# Patient Record
Sex: FEMALE | Race: WHITE | URBAN - METROPOLITAN AREA
[De-identification: names, ages, dates, MRNs, and addresses within clinical notes are randomized per-mention and may not be internally consistent; named-entity substitution may affect disease eponyms.]

---

## 2019-08-25 ENCOUNTER — HOSPITAL ENCOUNTER (INPATIENT)
Age: 24
LOS: 2 days | Discharge: HOME | End: 2019-08-28
Attending: OBSTETRICS & GYNECOLOGY | Admitting: OBSTETRICS & GYNECOLOGY

## 2019-08-26 ENCOUNTER — ANESTHESIA (OUTPATIENT)
Dept: LABOR AND DELIVERY | Age: 24
End: 2019-08-26

## 2019-08-26 ENCOUNTER — ANESTHESIA EVENT (OUTPATIENT)
Dept: LABOR AND DELIVERY | Age: 24
End: 2019-08-26

## 2019-08-26 PROBLEM — O26.93 COMPLICATION OF PREGNANCY IN THIRD TRIMESTER: Status: ACTIVE | Noted: 2019-08-26

## 2019-08-26 LAB
ABO/RH: NORMAL
ABO/RH: NORMAL
AMPHETAMINE SCREEN, URINE: NOT DETECTED
ANION GAP SERPL CALCULATED.3IONS-SCNC: 14 MMOL/L (ref 7–16)
ANTIBODY SCREEN: NORMAL
BARBITURATE SCREEN URINE: NOT DETECTED
BASOPHILS ABSOLUTE: 0.06 E9/L (ref 0–0.2)
BASOPHILS RELATIVE PERCENT: 0.5 % (ref 0–2)
BENZODIAZEPINE SCREEN, URINE: NOT DETECTED
BUN BLDV-MCNC: 7 MG/DL (ref 6–20)
CALCIUM SERPL-MCNC: 9.3 MG/DL (ref 8.6–10.2)
CANNABINOID SCREEN URINE: NOT DETECTED
CHLORIDE BLD-SCNC: 102 MMOL/L (ref 98–107)
CO2: 20 MMOL/L (ref 22–29)
COCAINE METABOLITE SCREEN URINE: NOT DETECTED
CREAT SERPL-MCNC: 0.6 MG/DL (ref 0.5–1)
EOSINOPHILS ABSOLUTE: 0.04 E9/L (ref 0.05–0.5)
EOSINOPHILS RELATIVE PERCENT: 0.3 % (ref 0–6)
GFR AFRICAN AMERICAN: >60
GFR NON-AFRICAN AMERICAN: >60 ML/MIN/1.73
GLUCOSE BLD-MCNC: 87 MG/DL (ref 74–99)
HCT VFR BLD CALC: 39 % (ref 34–48)
HEMOGLOBIN: 13.3 G/DL (ref 11.5–15.5)
IMMATURE GRANULOCYTES #: 0.46 E9/L
IMMATURE GRANULOCYTES %: 4 % (ref 0–5)
LYMPHOCYTES ABSOLUTE: 2.34 E9/L (ref 1.5–4)
LYMPHOCYTES RELATIVE PERCENT: 20.1 % (ref 20–42)
Lab: NORMAL
MCH RBC QN AUTO: 32.2 PG (ref 26–35)
MCHC RBC AUTO-ENTMCNC: 34.1 % (ref 32–34.5)
MCV RBC AUTO: 94.4 FL (ref 80–99.9)
METHADONE SCREEN, URINE: NOT DETECTED
MONOCYTES ABSOLUTE: 0.85 E9/L (ref 0.1–0.95)
MONOCYTES RELATIVE PERCENT: 7.3 % (ref 2–12)
NEUTROPHILS ABSOLUTE: 7.88 E9/L (ref 1.8–7.3)
NEUTROPHILS RELATIVE PERCENT: 67.8 % (ref 43–80)
OPIATE SCREEN URINE: NOT DETECTED
PDW BLD-RTO: 12.4 FL (ref 11.5–15)
PHENCYCLIDINE SCREEN URINE: NOT DETECTED
PLATELET # BLD: 238 E9/L (ref 130–450)
PMV BLD AUTO: 9.5 FL (ref 7–12)
POTASSIUM SERPL-SCNC: 3.7 MMOL/L (ref 3.5–5)
PROPOXYPHENE SCREEN: NOT DETECTED
RBC # BLD: 4.13 E12/L (ref 3.5–5.5)
SODIUM BLD-SCNC: 136 MMOL/L (ref 132–146)
WBC # BLD: 11.6 E9/L (ref 4.5–11.5)

## 2019-08-26 PROCEDURE — 80307 DRUG TEST PRSMV CHEM ANLYZR: CPT

## 2019-08-26 PROCEDURE — 86900 BLOOD TYPING SEROLOGIC ABO: CPT

## 2019-08-26 PROCEDURE — 86901 BLOOD TYPING SEROLOGIC RH(D): CPT

## 2019-08-26 PROCEDURE — 7200000001 HC VAGINAL DELIVERY

## 2019-08-26 PROCEDURE — 99465 NB RESUSCITATION: CPT

## 2019-08-26 PROCEDURE — 1220000001 HC SEMI PRIVATE L&D R&B

## 2019-08-26 PROCEDURE — 2580000003 HC RX 258: Performed by: OBSTETRICS & GYNECOLOGY

## 2019-08-26 PROCEDURE — 85025 COMPLETE CBC W/AUTO DIFF WBC: CPT

## 2019-08-26 PROCEDURE — 2500000003 HC RX 250 WO HCPCS: Performed by: ANESTHESIOLOGY

## 2019-08-26 PROCEDURE — 36415 COLL VENOUS BLD VENIPUNCTURE: CPT

## 2019-08-26 PROCEDURE — 51702 INSERT TEMP BLADDER CATH: CPT

## 2019-08-26 PROCEDURE — 3E033VJ INTRODUCTION OF OTHER HORMONE INTO PERIPHERAL VEIN, PERCUTANEOUS APPROACH: ICD-10-PCS | Performed by: OBSTETRICS & GYNECOLOGY

## 2019-08-26 PROCEDURE — 6360000002 HC RX W HCPCS: Performed by: OBSTETRICS & GYNECOLOGY

## 2019-08-26 PROCEDURE — 86850 RBC ANTIBODY SCREEN: CPT

## 2019-08-26 PROCEDURE — 80048 BASIC METABOLIC PNL TOTAL CA: CPT

## 2019-08-26 PROCEDURE — 3700000025 EPIDURAL BLOCK: Performed by: ANESTHESIOLOGY

## 2019-08-26 RX ORDER — LANOLIN 100 %
OINTMENT (GRAM) TOPICAL PRN
Status: DISCONTINUED | OUTPATIENT
Start: 2019-08-26 | End: 2019-08-28 | Stop reason: HOSPADM

## 2019-08-26 RX ORDER — SODIUM CHLORIDE, SODIUM LACTATE, POTASSIUM CHLORIDE, CALCIUM CHLORIDE 600; 310; 30; 20 MG/100ML; MG/100ML; MG/100ML; MG/100ML
500 INJECTION, SOLUTION INTRAVENOUS
Status: ACTIVE | OUTPATIENT
Start: 2019-08-26 | End: 2019-08-26

## 2019-08-26 RX ORDER — TERBUTALINE SULFATE 1 MG/ML
0.25 INJECTION, SOLUTION SUBCUTANEOUS
Status: DISPENSED | OUTPATIENT
Start: 2019-08-26 | End: 2019-08-26

## 2019-08-26 RX ORDER — NALBUPHINE HCL 10 MG/ML
10 AMPUL (ML) INJECTION
Status: DISCONTINUED | OUTPATIENT
Start: 2019-08-26 | End: 2019-08-28 | Stop reason: HOSPADM

## 2019-08-26 RX ORDER — OXYCODONE HYDROCHLORIDE AND ACETAMINOPHEN 5; 325 MG/1; MG/1
2 TABLET ORAL EVERY 4 HOURS PRN
Status: DISCONTINUED | OUTPATIENT
Start: 2019-08-26 | End: 2019-08-28 | Stop reason: HOSPADM

## 2019-08-26 RX ORDER — KETOROLAC TROMETHAMINE 30 MG/ML
30 INJECTION, SOLUTION INTRAMUSCULAR; INTRAVENOUS EVERY 6 HOURS PRN
Status: ACTIVE | OUTPATIENT
Start: 2019-08-26 | End: 2019-08-26

## 2019-08-26 RX ORDER — ONDANSETRON 2 MG/ML
4 INJECTION INTRAMUSCULAR; INTRAVENOUS EVERY 6 HOURS PRN
Status: DISCONTINUED | OUTPATIENT
Start: 2019-08-26 | End: 2019-08-28 | Stop reason: HOSPADM

## 2019-08-26 RX ORDER — OXYCODONE HYDROCHLORIDE AND ACETAMINOPHEN 5; 325 MG/1; MG/1
1 TABLET ORAL EVERY 4 HOURS PRN
Status: DISCONTINUED | OUTPATIENT
Start: 2019-08-26 | End: 2019-08-28 | Stop reason: HOSPADM

## 2019-08-26 RX ORDER — SODIUM CHLORIDE 0.9 % (FLUSH) 0.9 %
10 SYRINGE (ML) INJECTION EVERY 12 HOURS SCHEDULED
Status: DISCONTINUED | OUTPATIENT
Start: 2019-08-26 | End: 2019-08-28 | Stop reason: HOSPADM

## 2019-08-26 RX ORDER — EPHEDRINE SULFATE/0.9% NACL/PF 50 MG/5 ML
10 SYRINGE (ML) INTRAVENOUS EVERY 5 MIN PRN
Status: DISCONTINUED | OUTPATIENT
Start: 2019-08-26 | End: 2019-08-28 | Stop reason: HOSPADM

## 2019-08-26 RX ORDER — SODIUM CHLORIDE, SODIUM LACTATE, POTASSIUM CHLORIDE, CALCIUM CHLORIDE 600; 310; 30; 20 MG/100ML; MG/100ML; MG/100ML; MG/100ML
INJECTION, SOLUTION INTRAVENOUS CONTINUOUS
Status: DISCONTINUED | OUTPATIENT
Start: 2019-08-26 | End: 2019-08-28 | Stop reason: HOSPADM

## 2019-08-26 RX ORDER — LIDOCAINE HYDROCHLORIDE 10 MG/ML
INJECTION, SOLUTION EPIDURAL; INFILTRATION; INTRACAUDAL; PERINEURAL
Status: DISPENSED
Start: 2019-08-26 | End: 2019-08-26

## 2019-08-26 RX ORDER — ACETAMINOPHEN 325 MG/1
650 TABLET ORAL EVERY 4 HOURS PRN
Status: DISCONTINUED | OUTPATIENT
Start: 2019-08-26 | End: 2019-08-28 | Stop reason: HOSPADM

## 2019-08-26 RX ORDER — PRENATAL VIT NO.126/IRON/FOLIC 28MG-0.8MG
1 TABLET ORAL DAILY
Refills: 1 | COMMUNITY
Start: 2019-08-16

## 2019-08-26 RX ORDER — NALBUPHINE HCL 10 MG/ML
AMPUL (ML) INJECTION
Status: DISPENSED
Start: 2019-08-26 | End: 2019-08-27

## 2019-08-26 RX ORDER — NALBUPHINE HCL 10 MG/ML
10 AMPUL (ML) INJECTION
Status: COMPLETED | OUTPATIENT
Start: 2019-08-26 | End: 2019-08-26

## 2019-08-26 RX ORDER — SODIUM CHLORIDE 0.9 % (FLUSH) 0.9 %
10 SYRINGE (ML) INJECTION PRN
Status: DISCONTINUED | OUTPATIENT
Start: 2019-08-26 | End: 2019-08-28 | Stop reason: HOSPADM

## 2019-08-26 RX ORDER — METHYLERGONOVINE MALEATE 0.2 MG/ML
200 INJECTION INTRAVENOUS PRN
Status: DISCONTINUED | OUTPATIENT
Start: 2019-08-26 | End: 2019-08-28 | Stop reason: HOSPADM

## 2019-08-26 RX ORDER — DOCUSATE SODIUM 100 MG/1
100 CAPSULE, LIQUID FILLED ORAL DAILY
Status: DISCONTINUED | OUTPATIENT
Start: 2019-08-26 | End: 2019-08-28 | Stop reason: HOSPADM

## 2019-08-26 RX ORDER — FERROUS SULFATE 325(65) MG
325 TABLET ORAL 2 TIMES DAILY WITH MEALS
Status: DISCONTINUED | OUTPATIENT
Start: 2019-08-26 | End: 2019-08-28 | Stop reason: HOSPADM

## 2019-08-26 RX ORDER — SODIUM CHLORIDE, SODIUM LACTATE, POTASSIUM CHLORIDE, CALCIUM CHLORIDE 600; 310; 30; 20 MG/100ML; MG/100ML; MG/100ML; MG/100ML
500 INJECTION, SOLUTION INTRAVENOUS CONTINUOUS
Status: DISCONTINUED | OUTPATIENT
Start: 2019-08-26 | End: 2019-08-28 | Stop reason: HOSPADM

## 2019-08-26 RX ADMIN — NALBUPHINE HYDROCHLORIDE 10 MG: 10 INJECTION, SOLUTION INTRAMUSCULAR; INTRAVENOUS; SUBCUTANEOUS at 03:25

## 2019-08-26 RX ADMIN — Medication 15 ML: at 14:28

## 2019-08-26 RX ADMIN — SODIUM CHLORIDE, POTASSIUM CHLORIDE, SODIUM LACTATE AND CALCIUM CHLORIDE: 600; 310; 30; 20 INJECTION, SOLUTION INTRAVENOUS at 14:46

## 2019-08-26 RX ADMIN — NALBUPHINE HYDROCHLORIDE 10 MG: 10 INJECTION, SOLUTION INTRAMUSCULAR; INTRAVENOUS; SUBCUTANEOUS at 00:31

## 2019-08-26 RX ADMIN — SODIUM CHLORIDE, POTASSIUM CHLORIDE, SODIUM LACTATE AND CALCIUM CHLORIDE: 600; 310; 30; 20 INJECTION, SOLUTION INTRAVENOUS at 12:21

## 2019-08-26 RX ADMIN — SODIUM CHLORIDE, POTASSIUM CHLORIDE, SODIUM LACTATE AND CALCIUM CHLORIDE: 600; 310; 30; 20 INJECTION, SOLUTION INTRAVENOUS at 00:29

## 2019-08-26 RX ADMIN — Medication 10 ML: at 08:45

## 2019-08-26 RX ADMIN — Medication 2 MILLI-UNITS/MIN: at 13:00

## 2019-08-26 RX ADMIN — Medication 15 ML/HR: at 14:41

## 2019-08-26 ASSESSMENT — PAIN SCALES - GENERAL
PAINLEVEL_OUTOF10: 8
PAINLEVEL_OUTOF10: 2
PAINLEVEL_OUTOF10: 5
PAINLEVEL_OUTOF10: 7

## 2019-08-26 NOTE — PROGRESS NOTES
SROM clear fluid-scant amount leaking from vagina. Moderate discomfort of contractions now despite IV Nubain given-requesting epidural now. IV Bolus initiated.   notified

## 2019-08-26 NOTE — PROGRESS NOTES
Comfortable at this time.  at bedside. Clear liquids given. Call bell in reach. [facility] patent at bedside draining clear yellow urine.

## 2019-08-26 NOTE — PROGRESS NOTES
Patient  40w2d to L&D complaining of regular contractions. Patient perceives fetal movement and fetal well being established via EFM.

## 2019-08-26 NOTE — PROGRESS NOTES
Patient becoming more uncomfortable now. Vocal and crying out in pain. Requesting IV analgesia for relief. IV Nubain given.

## 2019-08-26 NOTE — PROGRESS NOTES
IV Pitocin augmentation of labor initiated as ordered. RN assessing FHR and contractions [id number] while Pitocin infusing. Call bell in reach.

## 2019-08-26 NOTE — ANESTHESIA PRE PROCEDURE
results found for: PREGTESTUR, PREGSERUM, HCG, HCGQUANT     ABGs: No results found for: PHART, PO2ART, ZDJ1UMP, RFY8SMC, BEART, D8MMZUPX     Type & Screen (If Applicable):  No results found for: LABABO, [address]    Anesthesia Evaluation  Patient summary reviewed  Airway:         Dental:          Pulmonary:Negative Pulmonary ROS and normal exam  breath sounds clear to auscultation                             Cardiovascular:Negative CV ROS            Rhythm: regular  Rate: normal                    Neuro/Psych:   Negative Neuro/Psych ROS              GI/Hepatic/Renal: Neg GI/Hepatic/Renal ROS            Endo/Other: Negative Endo/Other ROS                    Abdominal:           Vascular: negative vascular ROS. Anesthesia Plan      epidural     ASA 2     (Labor Epidural)  Induction: intravenous. BIS  MIPS: Postoperative opioids intended. Anesthetic plan and risks discussed with patient. Plan discussed with CRNA.     Attending anesthesiologist reviewed and agrees with Dread Castillo MD   8/26/2019

## 2019-08-26 NOTE — PROGRESS NOTES
Dr. Beatriz Montes notified of patient's arrival, complaint, FHR tracing, uterine activity, and cervical exam. Orders received.

## 2019-08-27 LAB
HCT VFR BLD CALC: 33.6 % (ref 34–48)
HEMOGLOBIN: 11.3 G/DL (ref 11.5–15.5)

## 2019-08-27 PROCEDURE — 87169 MACROSCOPIC EXAM PARASITE: CPT

## 2019-08-27 PROCEDURE — 6370000000 HC RX 637 (ALT 250 FOR IP): Performed by: OBSTETRICS & GYNECOLOGY

## 2019-08-27 PROCEDURE — 36415 COLL VENOUS BLD VENIPUNCTURE: CPT

## 2019-08-27 PROCEDURE — 2580000003 HC RX 258: Performed by: OBSTETRICS & GYNECOLOGY

## 2019-08-27 PROCEDURE — 85014 HEMATOCRIT: CPT

## 2019-08-27 PROCEDURE — 85018 HEMOGLOBIN: CPT

## 2019-08-27 PROCEDURE — 1220000001 HC SEMI PRIVATE L&D R&B

## 2019-08-27 RX ADMIN — WITCH HAZEL 40 EACH: 500 SOLUTION RECTAL; TOPICAL at 00:43

## 2019-08-27 RX ADMIN — Medication: at 00:42

## 2019-08-27 RX ADMIN — DOCUSATE SODIUM 100 MG: 100 CAPSULE, LIQUID FILLED ORAL at 00:43

## 2019-08-27 RX ADMIN — Medication 10 ML: at 07:58

## 2019-08-27 RX ADMIN — OXYCODONE HYDROCHLORIDE AND ACETAMINOPHEN 1 TABLET: 5; 325 TABLET ORAL at 05:45

## 2019-08-27 RX ADMIN — OXYCODONE HYDROCHLORIDE AND ACETAMINOPHEN 2 TABLET: 5; 325 TABLET ORAL at 21:50

## 2019-08-27 RX ADMIN — OXYCODONE HYDROCHLORIDE AND ACETAMINOPHEN 2 TABLET: 5; 325 TABLET ORAL at 09:33

## 2019-08-27 RX ADMIN — OXYCODONE HYDROCHLORIDE AND ACETAMINOPHEN 2 TABLET: 5; 325 TABLET ORAL at 00:43

## 2019-08-27 RX ADMIN — OXYCODONE HYDROCHLORIDE AND ACETAMINOPHEN 1 TABLET: 5; 325 TABLET ORAL at 16:38

## 2019-08-27 RX ADMIN — DOCUSATE SODIUM 100 MG: 100 CAPSULE, LIQUID FILLED ORAL at 07:58

## 2019-08-27 ASSESSMENT — PAIN DESCRIPTION - LOCATION
LOCATION: VAGINA
LOCATION: VAGINA

## 2019-08-27 ASSESSMENT — PAIN SCALES - GENERAL
PAINLEVEL_OUTOF10: 7
PAINLEVEL_OUTOF10: 0
PAINLEVEL_OUTOF10: 4
PAINLEVEL_OUTOF10: 0
PAINLEVEL_OUTOF10: 5
PAINLEVEL_OUTOF10: 4
PAINLEVEL_OUTOF10: 7

## 2019-08-27 ASSESSMENT — PAIN DESCRIPTION - ONSET
ONSET: GRADUAL
ONSET: GRADUAL

## 2019-08-27 ASSESSMENT — PAIN DESCRIPTION - PAIN TYPE
TYPE: ACUTE PAIN
TYPE: ACUTE PAIN

## 2019-08-27 ASSESSMENT — PAIN - FUNCTIONAL ASSESSMENT
PAIN_FUNCTIONAL_ASSESSMENT: PREVENTS OR INTERFERES SOME ACTIVE ACTIVITIES AND ADLS
PAIN_FUNCTIONAL_ASSESSMENT: ACTIVITIES ARE NOT PREVENTED

## 2019-08-27 ASSESSMENT — PAIN DESCRIPTION - FREQUENCY
FREQUENCY: INTERMITTENT
FREQUENCY: INTERMITTENT

## 2019-08-27 ASSESSMENT — PAIN DESCRIPTION - DESCRIPTORS
DESCRIPTORS: ACHING;CRAMPING
DESCRIPTORS: ACHING;SORE;CRAMPING

## 2019-08-27 ASSESSMENT — PAIN DESCRIPTION - PROGRESSION
CLINICAL_PROGRESSION: GRADUALLY WORSENING
CLINICAL_PROGRESSION: GRADUALLY WORSENING

## 2019-08-27 NOTE — H&P
Department of Obstetrics and Gynecology   Obstetrics History and Physical      Parker Rodriguez  2019  [id number]    CHIEF COMPLAINT:  abd pains    HISTORY OF PRESENT ILLNESS:      The patient is a 25 y.o. female  at 44w3d. OB History        1    Para        Term                AB        Living           SAB        TAB        Ectopic        Molar        Multiple        Live Births                Patient presents with a chief complaint as above and is being admitted for E and M for labor    Estimated Due Date: Estimated Date of Delivery: 19    PRENATAL CARE:    Complicated by:   Patient Active Problem List   Diagnosis Code    Complication of pregnancy in third trimester O26.93       PAST OB HISTORY  OB History        1    Para        Term                AB        Living           SAB        TAB        Ectopic        Molar        Multiple        Live Births                    Past Medical History:    History reviewed. No pertinent past medical history. Past Surgical History:    History reviewed. No pertinent surgical history. Allergies:  Patient has no known allergies.   Social History:    Social History     Socioeconomic History    Marital status: Single     Spouse name: Not on file    Number of children: Not on file    Years of education: Not on file    Highest education level: Not on file   Occupational History    Not on file   Social Needs    Financial resource strain: Not on file    Food insecurity:     Worry: Not on file     Inability: Not on file    Transportation needs:     Medical: Not on file     Non-medical: Not on file   Tobacco Use    Smoking status: Never Smoker    Smokeless tobacco: Never Used   Substance and Sexual Activity    Alcohol use: Not Currently    Drug use: Never    Sexual activity: Yes     Partners: Male   Lifestyle    Physical activity:     Days per week: Not on file     Minutes per session: Not on file    Stress: Not on file

## 2019-08-27 NOTE — PROGRESS NOTES
Patient up to restroom and able to void gait slow and steady no c/o dizziness. Patient showered as well at this time, RN remains in room. Bed linens changed.

## 2019-08-27 NOTE — CARE COORDINATION
Consult noted regarding \"questionable insurance/bed bugs\" met with pt and fob, explained sw role and consult, both pt and fob were pleasant and agreeable to speaking with sw, state that they have a private self funded insurance through  [employer]\" which they pay into monthly along with a group of people which are put into a fund which cover any medical expenses, he states he gave the card to admissions upon their arrival to the hospital, vm message for Maria Del Carmen in Public Benefits office also, pt and fob deny ever having any bed bugs in their home, no bites noted by sw or nursing staff on either of them, he states it was the first time he ever saw one and believes it must of already been in other room when they arrived, nursing aware, they report this is their first baby, to having all provisions needed to take  home and to having good supports, Crystal is planning to breast feed, denies wanting [facility] or financial assistance or any ss needs at this time, fob did accept info on bed bug home preparation service agency however did not feel it would be necessary.  Electronically signed by MICHAEL Prescott on 2019 at 1:52 PM

## 2019-08-28 VITALS
DIASTOLIC BLOOD PRESSURE: 68 MMHG | RESPIRATION RATE: 16 BRPM | SYSTOLIC BLOOD PRESSURE: 120 MMHG | TEMPERATURE: 97.6 F | HEART RATE: 94 BPM | OXYGEN SATURATION: 97 % | HEIGHT: 68 IN | WEIGHT: 160 LBS | BODY MASS INDEX: 24.25 KG/M2

## 2019-08-28 PROCEDURE — 6370000000 HC RX 637 (ALT 250 FOR IP): Performed by: OBSTETRICS & GYNECOLOGY

## 2019-08-28 RX ORDER — OXYCODONE HYDROCHLORIDE AND ACETAMINOPHEN 5; 325 MG/1; MG/1
1 TABLET ORAL EVERY 8 HOURS PRN
Qty: 12 TABLET | Refills: 0 | Status: SHIPPED | OUTPATIENT
Start: 2019-08-28 | End: 2019-09-02

## 2019-08-28 RX ORDER — IBUPROFEN 800 MG/1
TABLET ORAL
Qty: 21 TABLET | Refills: 0 | Status: SHIPPED | OUTPATIENT
Start: 2019-08-28

## 2019-08-28 RX ADMIN — DOCUSATE SODIUM 100 MG: 100 CAPSULE, LIQUID FILLED ORAL at 07:55

## 2019-08-28 RX ADMIN — OXYCODONE HYDROCHLORIDE AND ACETAMINOPHEN 1 TABLET: 5; 325 TABLET ORAL at 07:55

## 2019-08-28 ASSESSMENT — PAIN DESCRIPTION - PAIN TYPE: TYPE: ACUTE PAIN

## 2019-08-28 ASSESSMENT — PAIN DESCRIPTION - DESCRIPTORS
DESCRIPTORS: ACHING
DESCRIPTORS: ACHING

## 2019-08-28 ASSESSMENT — PAIN - FUNCTIONAL ASSESSMENT: PAIN_FUNCTIONAL_ASSESSMENT: ACTIVITIES ARE NOT PREVENTED

## 2019-08-28 ASSESSMENT — PAIN SCALES - GENERAL
PAINLEVEL_OUTOF10: 5
PAINLEVEL_OUTOF10: 4

## 2019-08-28 ASSESSMENT — PAIN DESCRIPTION - LOCATION: LOCATION: PERINEUM

## 2019-08-28 ASSESSMENT — PAIN DESCRIPTION - ONSET: ONSET: GRADUAL

## 2019-08-28 ASSESSMENT — PAIN DESCRIPTION - PROGRESSION: CLINICAL_PROGRESSION: GRADUALLY WORSENING

## 2019-08-28 ASSESSMENT — PAIN DESCRIPTION - ORIENTATION: ORIENTATION: LOWER

## 2019-08-28 ASSESSMENT — PAIN DESCRIPTION - FREQUENCY: FREQUENCY: INTERMITTENT

## 2019-08-28 NOTE — PROGRESS NOTES
CLINICAL PHARMACY NOTE: MEDS TO [address] [place] [address] Drive Select Patient?: No  Total # of Prescriptions Filled: 2   The following medications were delivered to the patient:  · Ibuprofen 800 mg tablets  · Oxycodone/apap 5-325 tablets  Total # of Interventions Completed: 3  Time Spent (min): 15    Additional Documentation:

## 2019-08-29 LAB — DIRECT EXAM: NORMAL

## 2019-09-07 NOTE — DISCHARGE SUMMARY
Patient ID:  Riki Ng  [id number]  80 y.o.  1995         Discharge Summary      Admit date: 2019    Discharge date and time: 2019     Admitting Physician: ERICK Pearce FACOG     Diagnoses: Complication of pregnancy in third trimester [O26.93] augmentation with Pitocin    Discharged Condition: stable    Indication for Admission: 25 y.o. y.o. O4D7Gy8 admitted at Term pregnancy in spontaneous labor with augmentation with Pitocin without complications    Procedures Performed:      female      Information for the patient's :  Ish Guy [[id number]]      . apgars   Information for the patient's :  Ish Guy [[id number]]          . Hospital Course: Patient was admitted on the day  and underwent an uncomplicated procedure. Pt received analgesics IV and /or epidural anesthesia and delivered without comps. postpartum care was uncomplicated. H/H stable,Vital stable,,Uterus nontender,perineum healing well/ incision and wound dry no bleeding or oozing, Moderate lochia,voided without probs and passed flatus. Discharge Exam:  /68   Pulse 94   Temp 97.6 °F (36.4 °C) (Oral)   Resp 16   Ht 5' 8\" (1.727 m)   Wt 160 lb (72.6 kg)   LMP 2018   SpO2 97%   Breastfeeding? Unknown   BMI 24.33 kg/m²   General appearance: alert, appears stated age and cooperative  Back: symmetric, no curvature. ROM normal. No CVA tenderness. Abdomen: soft, non-tender; bowel sounds normal; no masses,  no organomegaly uterus well contracted nontender moderate lochia extremities: extremities normal, atraumatic, no cyanosis or edema    Disposition: home    Patient Instructions:    Activity: activity as tolerated  Diet: regular diet  Wound Care: none needed    Discharge Medication:    Darryl Kelley   Home Medication Instructions LER:[id number]    Printed on:19 6502   Medication Information                      ibuprofen (IBU) 800 MG tablet  1 tablet orally every 6-8

## 2024-02-08 ENCOUNTER — CLINICAL SUPPORT (OUTPATIENT)
Dept: AUDIOLOGY | Facility: CLINIC | Age: 29
End: 2024-02-08
Payer: COMMERCIAL

## 2024-02-08 ENCOUNTER — OFFICE VISIT (OUTPATIENT)
Dept: OTOLARYNGOLOGY | Facility: CLINIC | Age: 29
End: 2024-02-08
Payer: COMMERCIAL

## 2024-02-08 VITALS — BODY MASS INDEX: 20.4 KG/M2 | TEMPERATURE: 97.3 F | WEIGHT: 130 LBS | HEIGHT: 67 IN

## 2024-02-08 DIAGNOSIS — H92.10 OTORRHEA, UNSPECIFIED LATERALITY: Primary | ICD-10-CM

## 2024-02-08 DIAGNOSIS — H90.11 CONDUCTIVE HEARING LOSS OF RIGHT EAR WITH UNRESTRICTED HEARING OF LEFT EAR: Primary | ICD-10-CM

## 2024-02-08 DIAGNOSIS — H69.91 DYSFUNCTION OF RIGHT EUSTACHIAN TUBE: ICD-10-CM

## 2024-02-08 PROCEDURE — 1036F TOBACCO NON-USER: CPT | Performed by: OTOLARYNGOLOGY

## 2024-02-08 PROCEDURE — 92557 COMPREHENSIVE HEARING TEST: CPT | Performed by: AUDIOLOGIST

## 2024-02-08 PROCEDURE — 99203 OFFICE O/P NEW LOW 30 MIN: CPT | Performed by: OTOLARYNGOLOGY

## 2024-02-08 PROCEDURE — 92550 TYMPANOMETRY & REFLEX THRESH: CPT | Performed by: AUDIOLOGIST

## 2024-02-08 RX ORDER — CIPROFLOXACIN AND DEXAMETHASONE 3; 1 MG/ML; MG/ML
SUSPENSION/ DROPS AURICULAR (OTIC)
Qty: 7.5 ML | Refills: 1 | Status: SHIPPED | OUTPATIENT
Start: 2024-02-08 | End: 2024-02-08 | Stop reason: SDUPTHER

## 2024-02-08 RX ORDER — CIPROFLOXACIN AND DEXAMETHASONE 3; 1 MG/ML; MG/ML
SUSPENSION/ DROPS AURICULAR (OTIC)
Qty: 7.5 ML | Refills: 1 | Status: SHIPPED | OUTPATIENT
Start: 2024-02-08 | End: 2024-02-13

## 2024-02-08 ASSESSMENT — PATIENT HEALTH QUESTIONNAIRE - PHQ9
1. LITTLE INTEREST OR PLEASURE IN DOING THINGS: NOT AT ALL
SUM OF ALL RESPONSES TO PHQ9 QUESTIONS 1 & 2: 0
2. FEELING DOWN, DEPRESSED OR HOPELESS: NOT AT ALL

## 2024-02-08 NOTE — PROGRESS NOTES
"HPI      Manjula Murray is a 28 y.o. female with right-sided hearing loss and infection within the last month or so.  Treated with antibiotics and drops and improved but still with hearing loss on the right-hand side.  She had not had these problems before.  She does not have a dramatic history of ear problems.  She denies otalgia today.  Left side is asymptomatic.  Audiogram seen below with type C tympanogram on the right and conductive hearing loss      History reviewed. No pertinent past medical history.         Medications:   No current outpatient medications on file.     Allergies:  No Known Allergies     Physical Exam:  Last Recorded Vitals  Temperature 36.3 °C (97.3 °F), height 1.702 m (5' 7\"), weight 59 kg (130 lb).  General:     General appearance: Well-developed, well-nourished in no acute distress.       Voice:  normal       Head/face: Normal appearance; nontender to palpation     Facial nerve function: Normal and symmetric bilaterally.    Oral/oropharynx:     Oral vestibule: Normal labial and gingival mucosa     Tongue/floor of mouth: Normal without lesion     Oropharynx: Clear.  No lesions present of the hard/soft palate, posterior pharynx    Neck:     Neck: Normal appearance, trachea midline     Salivary glands: Normal to palpation bilaterally     Lymph nodes: No cervical lymphadenopathy to palpation     Thyroid: No thyromegaly.  No palpable nodules     Range of motion: Normal    Neurological:     Cortical functions: Alert and oriented x3, appropriate affect       Larynx/hypopharynx:     Laryngeal findings: Mirror exam inadequate or limited secondary to enlarged base of tongue and/or excessive gagging    Ear:     Ear canal: Left normal.  Right with medial cerumen impaction/debris, removed     Tympanic membrane: Intact and mobile left.  Right with severe retraction/perforation posteriorly with what appears to be some thinning of the I-S joint.  She does not have dramatic granulation or otorrhea but it " looks inflamed in general     Pinna: Normal bilaterally     Hearing:  Gross hearing assessment normal by voice    Nose:     Visualized using: Anterior rhinoscopy     Nasopharynx: Inadequate mirror exam secondary to gag, anatomy.       Nasal dorsum: Nontraumatic midline appearance     Septum: Midline     Inferior turbinates: Normally sized     Mucosa: Bilateral, pink, normal appearing       Assessment/Plan   Right ear cleaned.  We will treat with Ciprodex.  She has underlying evidence of eustachian tube dysfunction which appears to be more chronic than acute.  It is unclear to what degree her present awareness of symptoms is actually new.  I will see her back in a few weeks with another audiogram after treatment and proceed from there         Zeeshan Flowers MD

## 2024-02-09 ENCOUNTER — TELEPHONE (OUTPATIENT)
Dept: OTOLARYNGOLOGY | Facility: CLINIC | Age: 29
End: 2024-02-09
Payer: COMMERCIAL

## 2024-02-09 NOTE — TELEPHONE ENCOUNTER
Pt called and said the [facility] closed   I called in the [facility] drops [facility].     FYI

## 2024-02-21 ENCOUNTER — TELEPHONE (OUTPATIENT)
Dept: OTOLARYNGOLOGY | Facility: CLINIC | Age: 29
End: 2024-02-21
Payer: COMMERCIAL

## 2024-02-21 NOTE — TELEPHONE ENCOUNTER
Last seen 2/8/2024 for right sided hearing loss. Documentation states right severe retraction/perforation. She used ciprodex, last dose was 2/17. She said with using the ciprodex she started to regain a small amt of her hearing, but since she stopped using it her hearing is back down and she has pressure in the ear. She has a scheduled appt 3/5.  Anything further?

## 2024-02-28 ENCOUNTER — CLINICAL SUPPORT (OUTPATIENT)
Dept: AUDIOLOGY | Facility: CLINIC | Age: 29
End: 2024-02-28
Payer: COMMERCIAL

## 2024-02-28 ENCOUNTER — OFFICE VISIT (OUTPATIENT)
Dept: OTOLARYNGOLOGY | Facility: CLINIC | Age: 29
End: 2024-02-28
Payer: COMMERCIAL

## 2024-02-28 VITALS — WEIGHT: 130 LBS | HEIGHT: 67 IN | BODY MASS INDEX: 20.4 KG/M2 | TEMPERATURE: 96.8 F

## 2024-02-28 DIAGNOSIS — H90.11 CONDUCTIVE HEARING LOSS OF RIGHT EAR WITH UNRESTRICTED HEARING OF LEFT EAR: Primary | ICD-10-CM

## 2024-02-28 DIAGNOSIS — H69.91 DYSFUNCTION OF RIGHT EUSTACHIAN TUBE: Primary | ICD-10-CM

## 2024-02-28 DIAGNOSIS — H69.91 DYSFUNCTION OF RIGHT EUSTACHIAN TUBE: ICD-10-CM

## 2024-02-28 DIAGNOSIS — H90.11 CONDUCTIVE HEARING LOSS OF RIGHT EAR WITH UNRESTRICTED HEARING OF LEFT EAR: ICD-10-CM

## 2024-02-28 PROCEDURE — 99214 OFFICE O/P EST MOD 30 MIN: CPT | Performed by: OTOLARYNGOLOGY

## 2024-02-28 PROCEDURE — 92567 TYMPANOMETRY: CPT | Performed by: AUDIOLOGIST

## 2024-02-28 PROCEDURE — 1036F TOBACCO NON-USER: CPT | Performed by: OTOLARYNGOLOGY

## 2024-02-28 PROCEDURE — 92557 COMPREHENSIVE HEARING TEST: CPT | Performed by: AUDIOLOGIST

## 2024-02-28 NOTE — PROGRESS NOTES
"HPI      Manjula Murray is a 28 y.o. female with right-sided hearing loss.  Follow-up using drops.  No otalgia or otorrhea.  She continues to have the hearing loss as demonstrated by today's audiogram.  Mild conductive hearing loss on the right with type C tympanogram on the right.      History reviewed. No pertinent past medical history.         Medications:   No current outpatient medications on file.     Allergies:  No Known Allergies     Physical Exam:  Last Recorded Vitals  Temperature 36 °C (96.8 °F), height 1.702 m (5' 7\"), weight 59 kg (130 lb).  General:     General appearance: Well-developed, well-nourished in no acute distress.       Voice:  normal       Head/face: Normal appearance; nontender to palpation     Facial nerve function: Normal and symmetric bilaterally.    Oral/oropharynx:     Oral vestibule: Normal labial and gingival mucosa     Tongue/floor of mouth: Normal without lesion     Oropharynx: Clear.  No lesions present of the hard/soft palate, posterior pharynx    Neck:     Neck: Normal appearance, trachea midline     Salivary glands: Normal to palpation bilaterally     Lymph nodes: No cervical lymphadenopathy to palpation     Thyroid: No thyromegaly.  No palpable nodules     Range of motion: Normal    Neurological:     Cortical functions: Alert and oriented x3, appropriate affect       Larynx/hypopharynx:     Laryngeal findings: Mirror exam inadequate or limited secondary to enlarged base of tongue and/or excessive gagging    Ear:     Ear canal: Left normal.  Right normal     tympanic membrane: Intact and mobile left.  Right with severe retraction/perforation posteriorly with what appears to be some thinning of the I-S joint.  No granulation or otorrhea.  Inflammation resolved.     Pinna: Normal bilaterally     Hearing:  Gross hearing assessment normal by voice    Nose:     Visualized using: Flexible nasal endoscopy utilized secondary to inadequate anterior rhinoscopy  Nasopharynx: Inadequate " mirror examination as above, endoscopy demonstrates normal nasopharynx without obstruction or mass     Nasal dorsum: Nontraumatic midline appearance     Septum: Midline     Inferior turbinates: Normally sized     Mucosa: Bilateral, pink, normal appearing       Assessment/Plan   She has evidence of right eustachian tube dysfunction.  It looks chronic.  Her hearing loss is probably finally symptomatic.  I have recommended that she monitor this with another exam in a few months.  We discussed hearing aid evaluation.  This looks adhesive and not likely to respond to tympanostomy tube.  I have not recommended any procedural intervention at this time.  I have recommended a CT scan of the temporal bone.  She will call when this is done.  She will call if there is any worsening of her symptoms         Zeeshan Flowers MD

## 2024-02-28 NOTE — PROGRESS NOTES
AUDIOLOGY ADULT AUDIOMETRIC EVALUATION    Name:  Manjula Murray  :  1995  Age:  28 y.o.  Date of Evaluation:  2024    Reason for visit: Manjula is seen in the clinic today at the request of otolaryngology for an audiologic evaluation.     HISTORY  Patient reports recent history of eustachian tube dysfunction.   Audiogram 24 demonstrated mild conductive hearing loss right ear; negative middle ear pressure.   Patient reports her hearing was getting better, then it blocked up again.        EVALUATION  See scanned audiogram: “Media” > “Audiology Report”.      RESULTS  Otoscopic Evaluation:  Right Ear: clear ear canal  Left Ear: clear ear canal    Immittance Measures:  Tympanometry:  Right Ear: Type C, normal tympanic membrane mobility with significantly negative middle ear pressure  Left Ear: Type A, normal tympanic membrane mobility with normal middle ear pressure    Audiometry:  Test Technique and Reliability:   Standard audiometry via supra-aural headphones. Reliability is good.    Pure tone air and bone conduction audiometry:  Right Ear: Mild conductive hearing loss  Left Ear: Hearing sensitivity within normal limits 250Hz through 8KHz    Speech Audiometry (Word Recognition Scores):   Right Ear: Excellent   Left Ear: Excellent     IMPRESSIONS    Mild conductive hearing loss with negative middle ear pressure right ear.  Left ear within normal limits.      RECOMMENDATIONS  - Follow up with otolaryngology today as scheduled.  - Audiologic evaluation as needed.    PATIENT EDUCATION  Discussed results, impressions and recommendations with the patient. Questions were addressed and the patient was encouraged to contact our office should concerns arise.    Time for this encounter: 200/    Angelic Pitt M.A., CCC/A   Licensed Audiologist

## 2024-02-29 ENCOUNTER — TELEPHONE (OUTPATIENT)
Dept: OTOLARYNGOLOGY | Facility: CLINIC | Age: 29
End: 2024-02-29
Payer: COMMERCIAL

## 2024-02-29 NOTE — TELEPHONE ENCOUNTER
Seen 2/28/2024 and right TM had severe retraction. 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

## 2024-03-05 ENCOUNTER — APPOINTMENT (OUTPATIENT)
Dept: AUDIOLOGY | Facility: CLINIC | Age: 29
End: 2024-03-05
Payer: COMMERCIAL

## 2024-03-05 ENCOUNTER — APPOINTMENT (OUTPATIENT)
Dept: OTOLARYNGOLOGY | Facility: CLINIC | Age: 29
End: 2024-03-05
Payer: COMMERCIAL

## 2024-03-16 ENCOUNTER — HOSPITAL ENCOUNTER (OUTPATIENT)
Dept: RADIOLOGY | Facility: HOSPITAL | Age: 29
Discharge: HOME | End: 2024-03-16
Payer: COMMERCIAL

## 2024-03-16 DIAGNOSIS — H90.11 CONDUCTIVE HEARING LOSS OF RIGHT EAR WITH UNRESTRICTED HEARING OF LEFT EAR: ICD-10-CM

## 2024-03-16 PROCEDURE — 70480 CT ORBIT/EAR/FOSSA W/O DYE: CPT

## 2024-03-16 PROCEDURE — 70480 CT ORBIT/EAR/FOSSA W/O DYE: CPT | Performed by: RADIOLOGY

## 2024-03-18 ENCOUNTER — TELEPHONE (OUTPATIENT)
Dept: OTOLARYNGOLOGY | Facility: CLINIC | Age: 29
End: 2024-03-18
Payer: COMMERCIAL

## 2024-03-18 NOTE — TELEPHONE ENCOUNTER
LOV 2/28/2024 for ETD and right hearing loss. CT done and she is calling for results. Currently has an appt for June.

## 2024-04-03 ENCOUNTER — TELEPHONE (OUTPATIENT)
Dept: OTOLARYNGOLOGY | Facility: CLINIC | Age: 29
End: 2024-04-03

## 2024-04-03 ENCOUNTER — OFFICE VISIT (OUTPATIENT)
Dept: OTOLARYNGOLOGY | Facility: CLINIC | Age: 29
End: 2024-04-03
Payer: COMMERCIAL

## 2024-04-03 VITALS — HEIGHT: 67 IN | BODY MASS INDEX: 20.4 KG/M2 | WEIGHT: 130 LBS

## 2024-04-03 DIAGNOSIS — H90.11 CONDUCTIVE HEARING LOSS OF RIGHT EAR WITH UNRESTRICTED HEARING OF LEFT EAR: ICD-10-CM

## 2024-04-03 DIAGNOSIS — H69.91 DYSFUNCTION OF RIGHT EUSTACHIAN TUBE: Primary | ICD-10-CM

## 2024-04-03 PROCEDURE — 99213 OFFICE O/P EST LOW 20 MIN: CPT | Performed by: OTOLARYNGOLOGY

## 2024-04-03 PROCEDURE — 1036F TOBACCO NON-USER: CPT | Performed by: OTOLARYNGOLOGY

## 2024-04-03 NOTE — PROGRESS NOTES
"HPI      Manjula Murray is a 28 y.o. female with right-sided hearing loss.  She feels some improvement when she uses Valsalva which can be seen to lateralize the anterior tympanic membrane but does not release the tympanic membrane from the incus/I-S joint CT scan was reviewed and demonstrated 4 mm density around the posterior mesotympanum consistent with the retraction seen    History reviewed. No pertinent past medical history.         Medications:   No current outpatient medications on file.     Allergies:  No Known Allergies     Physical Exam:  Last Recorded Vitals  Height 1.702 m (5' 7\"), weight 59 kg (130 lb).  General:     General appearance: Well-developed, well-nourished in no acute distress.       Voice:  normal       Head/face: Normal appearance; nontender to palpation     Facial nerve function: Normal and symmetric bilaterally.    Oral/oropharynx:     Oral vestibule: Normal labial and gingival mucosa     Tongue/floor of mouth: Normal without lesion     Oropharynx: Clear.  No lesions present of the hard/soft palate, posterior pharynx    Neck:     Neck: Normal appearance, trachea midline     Salivary glands: Normal to palpation bilaterally     Lymph nodes: No cervical lymphadenopathy to palpation     Thyroid: No thyromegaly.  No palpable nodules     Range of motion: Normal    Neurological:     Cortical functions: Alert and oriented x3, appropriate affect       Larynx/hypopharynx:     Laryngeal findings: Mirror exam inadequate or limited secondary to enlarged base of tongue and/or excessive gagging    Ear:     Ear canal: Left normal.  Right normal     tympanic membrane: Intact and mobile left.  Right with severe retraction/perforation posteriorly with what appears to be some thinning of the I-S joint.  No granulation or otorrhea.  No inflammation or drainage.  Middle ear aerated on the left.     Pinna: Normal bilaterally     Hearing:  Gross hearing assessment normal by voice    Nose:     Visualized using: " Anterior rhinoscopy   nasal dorsum: Nontraumatic midline appearance     Septum: Midline     Inferior turbinates: Normally sized     Mucosa: Bilateral, pink, normal appearing       Assessment/Plan   She has evidence of right eustachian tube dysfunction.  It looks chronic.  We reviewed the CT scan and her physical exam and I offered her observation, hearing aid, and referral to one of my neurotology colleagues for surgical opinion.  She was given referral information and will consider her options.  I will see her back in 4 months, sooner as needed       Zeeshan Flowers MD

## 2024-04-03 NOTE — TELEPHONE ENCOUNTER
Pt called after leaving the office.  She would like to know long term effect of the retracted ear drum? Will this get worse? What effect will this have on her hearing and will  the hearing get worse?

## 2024-05-07 ENCOUNTER — APPOINTMENT (OUTPATIENT)
Dept: OTOLARYNGOLOGY | Facility: CLINIC | Age: 29
End: 2024-05-07
Payer: COMMERCIAL

## 2024-06-19 ENCOUNTER — APPOINTMENT (OUTPATIENT)
Dept: OTOLARYNGOLOGY | Facility: CLINIC | Age: 29
End: 2024-06-19
Payer: COMMERCIAL

## 2024-06-19 PROCEDURE — 88175 CYTOPATH C/V AUTO FLUID REDO: CPT

## 2024-06-19 PROCEDURE — 88141 CYTOPATH C/V INTERPRET: CPT | Performed by: PATHOLOGY

## 2024-06-19 PROCEDURE — 87624 HPV HI-RISK TYP POOLED RSLT: CPT

## 2024-06-20 ENCOUNTER — LAB REQUISITION (OUTPATIENT)
Dept: LAB | Facility: HOSPITAL | Age: 29
End: 2024-06-20
Payer: COMMERCIAL

## 2024-06-20 DIAGNOSIS — Z11.51 ENCOUNTER FOR SCREENING FOR HUMAN PAPILLOMAVIRUS (HPV): ICD-10-CM

## 2024-06-20 DIAGNOSIS — Z01.419 ENCOUNTER FOR GYNECOLOGICAL EXAMINATION (GENERAL) (ROUTINE) WITHOUT ABNORMAL FINDINGS: ICD-10-CM

## 2024-07-06 LAB
CYTOLOGY CMNT CVX/VAG CYTO-IMP: NORMAL
HPV HR 12 DNA GENITAL QL NAA+PROBE: NEGATIVE
HPV HR GENOTYPES PNL CVX NAA+PROBE: NEGATIVE
HPV16 DNA SPEC QL NAA+PROBE: NEGATIVE
HPV18 DNA SPEC QL NAA+PROBE: NEGATIVE
LAB AP HPV GENOTYPE QUESTION: YES
LAB AP HPV HR: NORMAL
LABORATORY COMMENT REPORT: NORMAL
PATH REPORT.TOTAL CANCER: NORMAL

## 2024-07-30 ENCOUNTER — APPOINTMENT (OUTPATIENT)
Dept: OTOLARYNGOLOGY | Facility: CLINIC | Age: 29
End: 2024-07-30
Payer: COMMERCIAL

## 2024-07-30 VITALS — BODY MASS INDEX: 20.25 KG/M2 | WEIGHT: 129 LBS | TEMPERATURE: 96.8 F | HEIGHT: 67 IN

## 2024-07-30 DIAGNOSIS — H69.91 DYSFUNCTION OF RIGHT EUSTACHIAN TUBE: Primary | ICD-10-CM

## 2024-07-30 DIAGNOSIS — H90.11 CONDUCTIVE HEARING LOSS OF RIGHT EAR WITH UNRESTRICTED HEARING OF LEFT EAR: ICD-10-CM

## 2024-07-30 PROCEDURE — 3008F BODY MASS INDEX DOCD: CPT | Performed by: OTOLARYNGOLOGY

## 2024-07-30 PROCEDURE — 99213 OFFICE O/P EST LOW 20 MIN: CPT | Performed by: OTOLARYNGOLOGY

## 2024-07-30 PROCEDURE — 1036F TOBACCO NON-USER: CPT | Performed by: OTOLARYNGOLOGY

## 2024-07-30 RX ORDER — DESOGESTREL AND ETHINYL ESTRADIOL 0.15-0.03
1 KIT ORAL
COMMUNITY
Start: 2024-06-19

## 2024-07-30 NOTE — PROGRESS NOTES
"HPI      Manjula Murray is a 29 y.o. female follow-up for monitoring of right posterior tympanic membrane retraction and hearing loss.  She feels worse in hearing over the last couple weeks.  Denies otalgia.  Left side stable    Prior history: With right-sided hearing loss.  She feels some improvement when she uses Valsalva which can be seen to lateralize the anterior tympanic membrane but does not release the tympanic membrane from the incus/I-S joint CT scan was reviewed and demonstrated 4 mm density around the posterior mesotympanum consistent with the retraction seen    History reviewed. No pertinent past medical history.         Medications:     Current Outpatient Medications:     Apri 0.15-0.03 mg tablet, Take 1 tablet by mouth early in the morning.., Disp: , Rfl:      Allergies:  No Known Allergies     Physical Exam:  Last Recorded Vitals  Temperature 36 °C (96.8 °F), height 1.702 m (5' 7\"), weight 58.5 kg (129 lb).  General:     General appearance: Well-developed, well-nourished in no acute distress.       Voice:  normal       Head/face: Normal appearance; nontender to palpation     Facial nerve function: Normal and symmetric bilaterally.    Oral/oropharynx:     Oral vestibule: Normal labial and gingival mucosa     Tongue/floor of mouth: Normal without lesion     Oropharynx: Clear.  No lesions present of the hard/soft palate, posterior pharynx    Neck:     Neck: Normal appearance, trachea midline     Salivary glands: Normal to palpation bilaterally     Lymph nodes: No cervical lymphadenopathy to palpation     Thyroid: No thyromegaly.  No palpable nodules     Range of motion: Normal    Neurological:     Cortical functions: Alert and oriented x3, appropriate affect       Larynx/hypopharynx:     Laryngeal findings: Mirror exam inadequate or limited secondary to enlarged base of tongue and/or excessive gagging    Ear:     Ear canal: Left normal.  Right normal     tympanic membrane: Intact and mobile left.  " Right with severe retraction and posterior debris which was suctioned.  There was no inflammation.  She has some thinning of the I-S joint.  No granulation or otorrhea.  Middle ear aerated on the left.     Pinna: Normal bilaterally     Hearing:  Gross hearing assessment normal by voice    Nose:     Visualized using: Anterior rhinoscopy   nasal dorsum: Nontraumatic midline appearance     Septum: Midline     Inferior turbinates: Normally sized     Mucosa: Bilateral, pink, normal appearing       Assessment/Plan   Right tympanic membrane retraction stable with some posterior debris which was cleaned today.  Hopefully this will help.  We again discussed hearing aid, observation/maintenance and referral for surgical opinion as options.  She will consider.      Zeeshan Flowers MD

## 2024-11-26 ENCOUNTER — APPOINTMENT (OUTPATIENT)
Dept: OTOLARYNGOLOGY | Facility: CLINIC | Age: 29
End: 2024-11-26
Payer: COMMERCIAL

## 2024-11-26 VITALS — HEIGHT: 67 IN | BODY MASS INDEX: 21.03 KG/M2 | TEMPERATURE: 96.9 F | WEIGHT: 134 LBS

## 2024-11-26 DIAGNOSIS — H90.11 CONDUCTIVE HEARING LOSS OF RIGHT EAR WITH UNRESTRICTED HEARING OF LEFT EAR: Primary | ICD-10-CM

## 2024-11-26 DIAGNOSIS — H69.91 DYSFUNCTION OF RIGHT EUSTACHIAN TUBE: ICD-10-CM

## 2024-11-26 PROCEDURE — 1036F TOBACCO NON-USER: CPT | Performed by: OTOLARYNGOLOGY

## 2024-11-26 PROCEDURE — 99213 OFFICE O/P EST LOW 20 MIN: CPT | Performed by: OTOLARYNGOLOGY

## 2024-11-26 PROCEDURE — 3008F BODY MASS INDEX DOCD: CPT | Performed by: OTOLARYNGOLOGY

## 2024-11-26 NOTE — PROGRESS NOTES
"HPI      Manjula Murray is a 29 y.o. female follow-up right sided tympanic membrane retraction and hearing loss.  She has had no otalgia or otorrhea.  Left side has remained asymptomatic.      Prior history: With right-sided hearing loss.  She feels some improvement when she uses Valsalva which can be seen to lateralize the anterior tympanic membrane but does not release the tympanic membrane from the incus/I-S joint CT scan was reviewed and demonstrated 4 mm density around the posterior mesotympanum consistent with the retraction seen    History reviewed. No pertinent past medical history.         Medications:     Current Outpatient Medications:     Apri 0.15-0.03 mg tablet, Take 1 tablet by mouth early in the morning.., Disp: , Rfl:      Allergies:  No Known Allergies     Physical Exam:  Last Recorded Vitals  Temperature 36.1 °C (96.9 °F), height 1.702 m (5' 7\"), weight 60.8 kg (134 lb).  General:     General appearance: Well-developed, well-nourished in no acute distress.       Voice:  normal       Head/face: Normal appearance; nontender to palpation     Facial nerve function: Normal and symmetric bilaterally.    Oral/oropharynx:     Oral vestibule: Normal labial and gingival mucosa     Tongue/floor of mouth: Normal without lesion     Oropharynx: Clear.  No lesions present of the hard/soft palate, posterior pharynx    Neck:     Neck: Normal appearance, trachea midline     Salivary glands: Normal to palpation bilaterally     Lymph nodes: No cervical lymphadenopathy to palpation     Thyroid: No thyromegaly.  No palpable nodules     Range of motion: Normal    Neurological:     Cortical functions: Alert and oriented x3, appropriate affect       Larynx/hypopharynx:     Laryngeal findings: Mirror exam inadequate or limited secondary to enlarged base of tongue and/or excessive gagging    Ear:     Ear canal: Left normal.  Right normal     tympanic membrane: Intact and mobile left.  Right with severe retraction and " minimal inferior canal cerumen.  Appears to have progressed somewhat.  No debris within the pocket.  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  There was no significant inflammation.  Left tympanic membrane intact, middle ear aerated.     Pinna: Normal bilaterally     Hearing:  Gross hearing assessment normal by voice    Nose:     Visualized using: Anterior rhinoscopy   nasal dorsum: Nontraumatic midline appearance     Septum: Midline     Inferior turbinates: Normally sized     Mucosa: Bilateral, pink, normal appearing       Assessment/Plan   Right tympanic membrane retraction stable but quite deep.  I recommended an opinion by Dr. Metzger and have referred her.  Right ear cleaned of some small amount of cerumen today.  Recheck at any point    Zeeshan Flowers MD

## 2025-02-08 NOTE — PROGRESS NOTES
History of present illness:  Manjula Murray is a 29 y.o. female presenting today for follow up. She was last seen by Dr Flowers for follow up on 11/26/2024. At that visit, she noted improvement when she used Valsalva which can be seen to lateralize the anterior tympanic membrane but does not release the tympanic membrane from the incus/I-S joint CT scan was reviewed and demonstrated 4 mm density around the posterior mesotympanum consistent with the retraction seen.     Today, she has complaints of difficulty hearing from her left ear.  She adds that she had 2 ear infections in December of 2024. She was given Augmentin in December as treatment. She mentions that when she gets hiccups, there is ear discomfort. She notes that her ear symptoms started when she had an ear infection last year. She denies any prior T tube placement.     The patient's current medications, active allergies and list of medical problems were reviewed in the EHR and confirmed electronically there are as follows;    Allergies:   No Known Allergies    Current list of medications:   Current Outpatient Medications   Medication Sig Dispense Refill    Apri 0.15-0.03 mg tablet Take 1 tablet by mouth early in the morning..       No current facility-administered medications for this visit.     Problem list:  There is no problem list on file for this patient.    Physical Examination:  CONSTITUTIONAL:  No acute distress  VOICE:  No hoarseness or other abnormality  RESPIRATION:  Breathing comfortably, no stridor  CV:  No clubbing/cyanosis/edema in hands  EYES:  EOM intact, sclera clear  NEURO:  Alert and oriented times 3, Cranial nerves II-XII grossly intact and symmetric bilaterally  HEAD AND FACE:  Symmetric facial features, no masses or lesions  RIGHT EAR:  Normal external ear and post auricular area, no visible lesions, external auditory canal patent, tympanic membrane intact, no retraction, no signs of mass, effusion, or infection within the middle  ear, cerumen removed, subtotal perforation and part of the ear drum is retracted anteriorly, skin debris in the mesotympanum, adjacent to the stapes superstructure. There is a concern for middle ear cholesteatoma, the long process of the incus is eroded and malleus is medialized and shortened, the stapes superstructure appears intact, no active infection  LEFT EAR: Normal external ear and post auricular area, no visible lesions, external auditory canal patent, tympanic membrane intact, no retraction, no signs of mass, effusion, or infection within the middle ear  NOSE:  Anterior rhinoscopy clear, no significant external deformity.  ORAL CAVITY/OROPHARYNX/LIPS:  normal lining, mobile tongue.  PHARYNGEAL WALLS: Moist mucosal lining, good palatal elevation  NECK/LYMPH:  No LAD, no thyroid masses, trachea midline  SKIN:  Neck and facial skin is without scar or injury  PSYCH:  Alert and oriented with appropriate mood and affect    Using the otomicroscope and with an appropriately sized ear speculum the ear canal was inspected on the right side. Obstructive cerumen was noted and removed using a combination of ear micro-instruments and suctions. TM was visualized. The procedure was well tolerated.     Diagnostic testing:    Audiometry:  Audiometry testing done on 2/13/2025 reveals:  On the right: mild conductive hearing loss  On the left: normal hearing sensitivity  Excellent speech discrimination bilaterally   Type C tympanograms on the right with negative pressure of -207 and Type A tympanograms on the left      CT done on 3/2024:  Right: an area of opacification medial to the ossicles, evidence of erosion on the long process of incus and possible stapes superstructure but is not well visualized, consistent with a middle ear cholesteatoma on the left, there is also evidence of retraction of the TM, the mastoid is well pneumatized.  Left: well pneumatized middle ear and mastoid, ossicles are intacta and there is no  cholesteatoma like lesion    I personally reviewed the available patient's external record and independently reviewed their audiometric testing [and] radiographic imaging through the appropriate viewing software as detailed in my note and agree with the detailed report.    Impression:  Right sided tympanic membrane retraction   Right sided conductive hearing loss   Right middle ear cholesteatoma     Recommendation:    She will be having a right sided post auricular tympanoplasty with overlay and a possibly mastoidectomy and cartilage graft.     The patient's condition was reviewed and explained. The treatment options and alternatives were outlined. I recommended surgical treatment for eradication of his disease and offered right tympanomastoid surgery. The possibility of a second stage surgery and deferred ossiculoplasty was discussed. The risks discussed included but not limited to bleeding, infection, hearing loss, tinnitus, dizziness, recurrent disease, taste disturbance, graft failure, facial nerve injury and other rare complications. The patient voiced understanding and agreed to proceed. All questions were answered to the patient's satisfaction.    I discussed with the patient the complexity of my medical decision making including the treatment and testing rational, indications of their elective procedure and possible adverse effects and/or complications. Based on the provided documentation and my professional assessment of this patient's chronic progressive conditions, the complexity of evaluation and treatment is moderate.    This note was created using speech recognition transcription software. Despite proofreading, several typographical errors might be present that might affect the meaning of the content. Please call with any questions.      By signing my name below, Truman DAVILA Scribe attest that this documentation has been prepared under the direction and in the presence of Teena Alexander MD

## 2025-02-13 ENCOUNTER — APPOINTMENT (OUTPATIENT)
Dept: OTOLARYNGOLOGY | Facility: CLINIC | Age: 30
End: 2025-02-13
Payer: COMMERCIAL

## 2025-02-13 ENCOUNTER — APPOINTMENT (OUTPATIENT)
Dept: AUDIOLOGY | Facility: CLINIC | Age: 30
End: 2025-02-13
Payer: COMMERCIAL

## 2025-02-13 VITALS — BODY MASS INDEX: 21.03 KG/M2 | HEIGHT: 67 IN | WEIGHT: 134 LBS

## 2025-02-13 DIAGNOSIS — H71.21 CHOLESTEATOMA OF RIGHT MIDDLE EAR AND MASTOID: ICD-10-CM

## 2025-02-13 DIAGNOSIS — H90.11 CONDUCTIVE HEARING LOSS OF RIGHT EAR WITH UNRESTRICTED HEARING OF LEFT EAR: Primary | ICD-10-CM

## 2025-02-13 DIAGNOSIS — H90.11 CONDUCTIVE HEARING LOSS OF RIGHT EAR WITH UNRESTRICTED HEARING OF LEFT EAR: ICD-10-CM

## 2025-02-13 DIAGNOSIS — H65.491 OTHER CHRONIC NONSUPPURATIVE OTITIS MEDIA OF RIGHT EAR: ICD-10-CM

## 2025-02-13 DIAGNOSIS — Z01.818 PREOPERATIVE TESTING: ICD-10-CM

## 2025-02-13 DIAGNOSIS — H72.91 PERFORATION OF RIGHT TYMPANIC MEMBRANE: ICD-10-CM

## 2025-02-13 PROBLEM — H66.90 CHRONIC OTITIS MEDIA: Status: ACTIVE | Noted: 2025-02-13

## 2025-02-13 PROCEDURE — 3008F BODY MASS INDEX DOCD: CPT | Performed by: OTOLARYNGOLOGY

## 2025-02-13 PROCEDURE — 92567 TYMPANOMETRY: CPT

## 2025-02-13 PROCEDURE — 92557 COMPREHENSIVE HEARING TEST: CPT

## 2025-02-13 PROCEDURE — 1036F TOBACCO NON-USER: CPT | Performed by: OTOLARYNGOLOGY

## 2025-02-13 PROCEDURE — 99204 OFFICE O/P NEW MOD 45 MIN: CPT | Performed by: OTOLARYNGOLOGY

## 2025-02-13 ASSESSMENT — PAIN - FUNCTIONAL ASSESSMENT: PAIN_FUNCTIONAL_ASSESSMENT: 0-10

## 2025-02-13 ASSESSMENT — PAIN SCALES - GENERAL: PAINLEVEL_OUTOF10: 0 - NO PAIN

## 2025-02-13 NOTE — PROGRESS NOTES
AUDIOLOGY ADULT AUDIOMETRIC EVALUATION     Name: Manjula Murray   : 1995 Age: 29 y.o.   Date of Evaluation: 2025 Time: 2952-0443     IMPRESSIONS   Mild conductive hearing loss in the right ear, and Hearing sensitivity within normal limits in the left ear. Decrease in thresholds in the right ear, and stable in the left ear since last evaluation on 2024.  Word recognition in quiet is excellent in both ears.  Tympanometry indicates negative middle ear pressure with double peaks and normal eardrum mobility in the right ear, and middle ear pressure and eardrum compliance within normal limits in the left ear.   Amplification needs: Need for amplification will be reassessed following medical intervention.    Today's test results are hearing loss requiring medical/otologic and audiologic follow-up.     RECOMMENDATIONS   Continue medical follow up with primary care provider and/or Ears Nose and Throat (ENT) provider as recommended.  Return for audiologic evaluation in conjunction with medical management or annually (whichever is sooner) to monitor hearing sensitivity and assess middle ear status or sooner should concerns arise. The audiology department can be reached at [phone] to schedule an appointment.   Avoid exposure to loud sounds by moving away from the noise, turning down the volume, or wearing proper hearing protection correctly.  Consider use of good communication strategies. These include but are not limited to the following: get Manjula's attention before speaking to her, close the distance between Manjula and who is speaking, limit background noise, allow Manjula access to visual cues (i.e. facial expressions/mouth movements, pictures, written instructions, etc.). When in situations where background noise cannot be avoided, position yourself so that the background noise is to your back, and you communication partner is seated in front of you, ideally with a quiet area or wall behind  them.     HISTORY   History obtained from patient report and chart review; please see medical records for complete history. Ms. Murray was seen today for a follow-up audiologic evaluation in conjunction with an evaluation with Teena Alexander MD.    Reason for visit: Retracted right tympanic membrane  Change in Hearing: yes in the right ear greater than the left ear  Tinnitus: denied  Otalgia: denied; Sharp pain with hiccups/swallow  Aural Pressure/Fullness: denied  Recent Ear Infections/Otorrhea: yes in both ears about two months ago  Dizziness: denied  History of Ear Surgeries: denied  History of Noise Exposure: yes, occupational noise exposure with yearly hearing tests and told hearing within normal limits  Hearing Aid Use: None  Family History of Hearing Loss: Yes, father related to accident at work; no genetic hearing loss   Falls within the last year: denied  Other Significant History: Patient reported that her father passed away last Friday and she had to return from [place], [place] for today's appointment.     See same day encounter with Teena Alexander MD in the Ears Nose and Throat (ENT) department for additional information.     Previous audiometric testing performed on 2/28/2024 revealed Mild conductive hearing loss with negative middle ear pressure right ear. Left ear within normal limits.    Recall from previous encounter in the audiology department on 2/28/2024: Patient reports recent history of eustachian tube dysfunction.  Audiogram 2/8/24 demonstrated mild conductive hearing loss right ear; negative middle ear pressure. Patient reports her hearing was getting better, then it blocked up again.     EVALUATION AND PATIENT EDUCATION   The following is a brief interpretation of the obtained findings from the audiologic evaluation. Discussed results and recommendations with Ms. Murray. Questions were addressed and the patient was encouraged to contact our department at [phone] should concerns arise.  "    TEST RESULTS - See scanned audiogram in \"Media.\"   Otoscopic Evaluation:   Right Ear: Ear canal clear, tympanic membrane visualized.   Left Ear: Ear canal clear, tympanic membrane visualized.       Tympanometry (226 Hz): An objective evaluation of middle ear function. CPT code: 42955   Right Ear: Type C, negative middle ear pressure (-277 daPa) and normal eardrum mobility. Note two peaks.    Left Ear: Type A, middle ear pressure and tympanic membrane compliance within normal limits.       Acoustic Reflexes: An objective measure of auditory and facial nerve pathways.   (Probe) Right Ear (ipsi right stimulus ear; contralateral left stimulus ear):   (Ipsilateral) Responses absent at 500-2000 Hz.   (Probe) Left Ear (ipsi left stimulus ear; contralateral right stimulus ear):   (Ipsilateral) Responses present at 500 and 1000 Hz, and absent at 2000 Hz.       Distortion Product Otoacoustic Emissions (DPOAE): An objective measurement of responses generated by the cochlea when simultaneously stimulated by two pure tone frequencies. CPT code: 41803   Right Ear: Did not test.   Left Ear: Did not test.   Present OAEs suggest normal or near cochlear outer hair cell function for corresponding frequency region(s). Absent OAEs with normal middle ear function can be consistent with some degree of hearing loss. Assessment of cochlear outer hair cell function may be impacted by outer or middle ear function.       Test technique: Conventional Audiometry via insert earphones.   An evaluation of hearing sensitivity via air and bone conduction and speech recognition. CPT code: 88200    Reliability: good       Pure Tone Audiometry:    Right Ear: Mild conductive hearing loss for 125-8000 Hz.    Left Ear: Hearing sensitivity within normal limits       Speech Audiometry:    Right Ear: Speech Reception Threshold (SRT) was obtained at 30 dB HL. This is in good agreement with three frequency Pure Tone Average (PTA).  Word Recognition scores " in quiet were excellent (100%) when words were presented at 70 dB HL. These results are based on [facility] Auditory Test No.6 (NU-6) Ordered by difficulty (N=10) and contralateral masking was presented at 40 dB HL.   Left Ear: Speech Reception Threshold (SRT) was obtained at 5 dB HL. This is in good agreement with three frequency Pure Tone Average (PTA).  Word Recognition scores in quiet were excellent (100%) when words were presented at 45 dB HL. These results are based on [facility] Auditory Test No.6 (NU-6) Ordered by difficulty (N=10).          Josie Fregoso, AUD, CCC-A  Licensed Clinical Audiologist    Degree of Hearing Decibel Range  Key   Within Normal Limits 0-20  CNT Could Not Test   Slight 21-25  DNT Did Not Test   Mild 26-40  ECV Ear Canal Volume   Moderate 41-55  OAE Otoacoustic Emissions   Moderately-Severe 56-70  SIN Speech in Noise   Severe 71-90  TM Tympanic Membrane   Profound 91+  HA Hearing Aid   Sensorineural Hearing Loss SNHL  MLV Monitored Live Voice   Conductive Hearing Loss CHL  WNL Within Normal Limits   Noise-Induced Hearing Loss NIHL

## 2025-03-12 ENCOUNTER — CLINICAL SUPPORT (OUTPATIENT)
Dept: PREADMISSION TESTING | Facility: HOSPITAL | Age: 30
End: 2025-03-12
Payer: COMMERCIAL

## 2025-03-12 RX ORDER — BISMUTH SUBSALICYLATE 262 MG
1 TABLET,CHEWABLE ORAL DAILY
COMMUNITY

## 2025-03-12 ASSESSMENT — DUKE ACTIVITY SCORE INDEX (DASI)
CAN YOU DO YARD WORK LIKE RAKING LEAVES, WEEDING OR PUSHING A MOWER: YES
CAN YOU RUN A SHORT DISTANCE: YES
DASI METS SCORE: 9.9
CAN YOU PARTICIPATE IN STRENOUS SPORTS LIKE SWIMMING, SINGLES TENNIS, FOOTBALL, BASKETBALL, OR SKIING: YES
TOTAL_SCORE: 58.2
CAN YOU PARTICIPATE IN MODERATE RECREATIONAL ACTIVITIES LIKE GOLF, BOWLING, DANCING, DOUBLES TENNIS OR THROWING A BASEBALL OR FOOTBALL: YES
CAN YOU WALK INDOORS, SUCH AS AROUND YOUR HOUSE: YES
CAN YOU WALK A BLOCK OR TWO ON LEVEL GROUND: YES
CAN YOU DO MODERATE WORK AROUND THE HOUSE LIKE VACUUMING, SWEEPING FLOORS OR CARRYING GROCERIES: YES
CAN YOU HAVE SEXUAL RELATIONS: YES
CAN YOU TAKE CARE OF YOURSELF (EAT, DRESS, BATHE, OR USE TOILET): YES
CAN YOU DO HEAVY WORK AROUND THE HOUSE LIKE SCRUBBING FLOORS OR LIFTING AND MOVING HEAVY FURNITURE: YES
CAN YOU CLIMB A FLIGHT OF STAIRS OR WALK UP A HILL: YES
CAN YOU DO LIGHT WORK AROUND THE HOUSE LIKE DUSTING OR WASHING DISHES: YES

## 2025-03-12 ASSESSMENT — ENCOUNTER SYMPTOMS
GASTROINTESTINAL NEGATIVE: 1
NEUROLOGICAL NEGATIVE: 1
CARDIOVASCULAR NEGATIVE: 1
NECK NEGATIVE: 1
CONSTITUTIONAL NEGATIVE: 1
RESPIRATORY NEGATIVE: 1

## 2025-03-12 NOTE — CPM/PAT H&P
CPM/PAT Evaluation       Name: Manjula Murray (Manjula Murray)  /Age: 1995/ y.o.     { PAT Visit Type:16426}      Chief Complaint: ***    HPI  Manjula Murray is scheduled for RIGHT TYMPANOPLASTY POSSIBLE MASTOIDECTOMY, OVERLAY. CARTILAGE GRAFT - Right with Dr. Alexander on 4/3/25.  Past Medical History:   Diagnosis Date    Cholesteatoma of right middle ear     Fractures ?    12 years old franctured my wrist    HL (hearing loss) 24    I had no pain, so I tried an ear wax cleaning kit at first. After a week and still not hearing properly I went to the  who couldnt see me for another 2 weeks. [facility] was diagnosed with a middle ear infection.    Otitis media, chronic Child    Tympanic membrane retraction, right        Past Surgical History:   Procedure Laterality Date     SECTION, LOW TRANSVERSE  2022    EYE SURGERY      laser eye surgery at age 5       Patient Sexual activity questions deferred to the physician.    Family History   Problem Relation Name Age of Onset    Hyperlipidemia Mother Yasmine     Arthritis Mother Yasmine     Diabetes Father Jean Paul Vu     Hyperlipidemia Father Jean Paul Vu     Hypertension Father Jean Paul Vu     Arthritis Father Jean Paul Vu     COPD Father Jean Paul Vu     Obesity Father Jean Paul Vu         350lbs    Brain Aneurysm Maternal Grandmother      Cancer Maternal Grandfather          colon    Cancer Paternal Grandmother      Other (hodgkin disease) Paternal Grandmother      Cancer Father's Sister Jennifer         breast cancer    Cancer Father's Sister Yordan         breast    Birth defects Other Aunt Selene     Other (ventricular septal defect) Other Aunt Selene     Celiac disease Other Aunt Selene     Other (pacemaker) Other Aunt Selene     Early natural death Other Uncle - suspected Heart attack        No Known Allergies    Prior to Admission medications    Medication Sig Start Date End Date Taking? Authorizing Provider   Apri  0.15-0.03 mg tablet Take 1 tablet by mouth early in the morning.. 6/19/24   Historical Provider, MD   multivitamin tablet Take 1 tablet by mouth once daily.    Historical Provider, MD AVENDAÑO ROS:   Constitutional:   neg    Neuro/Psych:   neg    Eyes:   Ears:    hearing loss  Nose:   neg    Mouth:   neg    Throat:   neg    Neck:   neg    Cardio:   neg    Respiratory:   neg    Endocrine:   GI:   neg    :   neg    Musculoskeletal:   Hematologic:   Skin:  neg        PAT Physical Exam     Airway    Testing/Diagnostic:   CT IAC WO IV CONTRAST; 3/16/2024   IMPRESSION:  4 mm fluid collection or soft tissue mass in the right mesotympanum  without bony erosion can not be further characterized.    Patient Specialist/PCP:   PCP: Paige Morgan [place] P: [phone], F: [phone]  Otolaryngology: Teena Alexander MD 2/13/25 presenting today for follow up. She was last seen by Dr Flowers for follow up on 11/26/2024. Today, she has complaints of difficulty hearing from her left ear.  She adds that she had 2 ear infections in December of 2024. She was given Augmentin in December as treatment. She mentions that when she gets hiccups, there is ear discomfort. She notes that her ear symptoms started when she had an ear infection last year. She denies any prior T tube placement.   Otolaryngology: Zeeshan Flowers MD 11/26/24 follow-up right sided tympanic membrane retraction and hearing loss. I recommended an opinion by Dr. Metzger and have referred her. Right ear cleaned of some small amount of cerumen today. Recheck at any point   OBGYN: Navjot Spann MD  Visit Vitals  Smoking Status Never       DASI Risk Score      Flowsheet Row Clinical Support from 3/12/2025 in [facility] Questionnaire Series Submission from 3/11/2025 in [facility] with Generic Provider Mark   Can you take care of yourself (eat, dress, bathe, or use toilet)?  2.75 filed at 03/12/2025 1331 2.75  filed at 03/11/2025 2302   Can  you walk indoors, such as around your house? 1.75 filed at 03/12/2025 1331 1.75  filed at 03/11/2025 2302   Can you walk a block or two on level ground?  2.75 filed at 03/12/2025 1331 2.75  filed at 03/11/2025 2302   Can you climb a flight of stairs or walk up a hill? 5.5 filed at 03/12/2025 1331 5.5  filed at 03/11/2025 2302   Can you run a short distance? 8 filed at 03/12/2025 1331 8  filed at 03/11/2025 2302   Can you do light work around the house like dusting or washing dishes? 2.7 filed at 03/12/2025 1331 2.7  filed at 03/11/2025 2302   Can you do moderate work around the house like vacuuming, sweeping floors or carrying groceries? 3.5 filed at 03/12/2025 1331 3.5  filed at 03/11/2025 2302   Can you do heavy work around the house like scrubbing floors or lifting and moving heavy furniture?  8 filed at 03/12/2025 1331 8  filed at 03/11/2025 2302   Can you do yard work like raking leaves, weeding or pushing a mower? 4.5 filed at 03/12/2025 1331 4.5  filed at 03/11/2025 2302   Can you have sexual relations? 5.25 filed at 03/12/2025 1331 5.25  filed at 03/11/2025 2302   Can you participate in moderate recreational activities like golf, bowling, dancing, doubles tennis or throwing a baseball or football? 6 filed at 03/12/2025 1331 6  filed at 03/11/2025 2302   Can you participate in strenous sports like swimming, singles tennis, football, basketball, or skiing? 7.5 filed at 03/12/2025 1331 7.5  filed at 03/11/2025 2302   DASI SCORE 58.2 filed at 03/12/2025 1331 58.2  filed at 03/11/2025 2302   METS Score (Will be calculated only when all the questions are answered) 9.9 filed at 03/12/2025 1331 9.9  filed at 03/11/2025 2302          Caprini DVT Assessment    No data to display       Modified Frailty Index    No data to display       [id number] Stroke Risk Points  Current as of just now        N/A 0 to 9 Points:      Last Change: N/A          The [id number] risk score (Lip CHANTEL, et al. 2009. © 2010 [employer] College  [employer]) quantifies the risk of stroke for a patient with atrial fibrillation. For patients without atrial fibrillation or under the age of 18 this score appears as N/A. Higher score values generally indicate higher risk of stroke.        This score is not applicable to this patient. Components are not calculated.          Revised Cardiac Risk Index    No data to display       Apfel Simplified Score    No data to display       Risk Analysis Index Results This Encounter    No data found in the last 10 encounters.       Stop Bang Score      Flowsheet Row Questionnaire Series Submission from 3/11/2025 in [facility] with Generic Provider Mark   Do you snore loudly? 0  filed at 03/11/2025 2302   Do you often feel tired or fatigued after your sleep? 0  filed at 03/11/2025 2302   Has anyone ever observed you stop breathing in your sleep? 1  filed at 03/11/2025 2302   Do you have or are you being treated for high blood pressure? 0  filed at 03/11/2025 2302   Recent BMI (Calculated) 21  filed at 03/11/2025 2302   Is BMI greater than 35 kg/m2? 0=No  filed at 03/11/2025 2302   Age older than 50 years old? 0=No  filed at 03/11/2025 2302   Gender - Male 0=No  filed at 03/11/2025 2302          Prodigy: High Risk  Total Score: 0          ARISCAT Score for Postoperative Pulmonary Complications    No data to display       Valentine Perioperative Risk for Myocardial Infarction or Cardiac Arrest (DADA)    No data to display         Assessment and Plan:   Medication Instructions:  Instructed to hold vitamins, supplements, and NSAIDs 7 days prior to surgery.      Nora Tirado RN  Pre Admission Testing   {[facility] ASSESSMENT AND PLAN:23580}

## 2025-03-18 ENCOUNTER — TELEMEDICINE CLINICAL SUPPORT (OUTPATIENT)
Dept: PREADMISSION TESTING | Facility: HOSPITAL | Age: 30
End: 2025-03-18
Payer: COMMERCIAL

## 2025-03-18 DIAGNOSIS — H71.21 CHOLESTEATOMA OF RIGHT MIDDLE EAR AND MASTOID: Primary | ICD-10-CM

## 2025-03-18 ASSESSMENT — ENCOUNTER SYMPTOMS
CONSTITUTIONAL NEGATIVE: 1
RESPIRATORY NEGATIVE: 1
EYES NEGATIVE: 1
NECK NEGATIVE: 1
GASTROINTESTINAL NEGATIVE: 1
NEUROLOGICAL NEGATIVE: 1
CARDIOVASCULAR NEGATIVE: 1

## 2025-03-18 ASSESSMENT — LIFESTYLE VARIABLES: SMOKING_STATUS: NONSMOKER

## 2025-03-18 NOTE — PREPROCEDURE INSTRUCTIONS
Fasting Guidelines    NPO Instructions:    Do not eat any food after midnight the night before your surgery/procedure.  You may have up to TEN ounces of clear liquids until TWO hours before your instructed arrival time to the hospital. This includes water, black tea/coffee, (no milk or cream), apple juice, and/or electrolyte drinks (Gatorade).  You may chew gum up to TWO hours before your surgery/procedure.    Additional Instructions:    Avoid herbal supplements, multivitamins and NSAIDS (non-steroidal anti-inflammatory drugs) such as Advil, Aleve, Ibuprofen, Naproxen, Excedrin, Meloxicam or Celebrex for at least 7 days prior to surgery. May take Tylenol as needed.    Avoid tobacco and alcohol products for 24 hours prior to surgery.    CONTACT SURGEON'S OFFICE IF YOU DEVELOP:  * Fever = 100.4 F   * New respiratory symptoms (e.g. cough, shortness of breath, respiratory distress, sore throat)  * Recent loss of taste or smell  *Flu like symptoms such as headache, fatigue or gastrointestinal symptoms  * You develop any open sores, shingles, burning or painful urination   AND/OR:  * You no longer wish to have the surgery.  * Any other personal circumstances change that may lead to the need to cancel or defer this surgery.  *You were admitted to any hospital within one week of your planned procedure.    Seven/Six Days before Surgery:  Review your medication instructions, stop indicated medications    Day of Surgery:  Review your medication instructions, take indicated medications  Wear comfortable loose fitting clothing  Do not use moisturizers, creams, lotions or perfume  All jewelry and valuables should be left at home        [facility]  [phone]       Preoperative Brain Exercises    What are brain exercises?  A brain exercise is any activity that engages your thinking (cognitive) skills.    What types of activities are considered brain exercises?  Jigsaw puzzles, crossword puzzles,  word jumble, memory games, word search, and many more.  Many can be found free online or on your phone via a mobile lloyd.    Why should I do brain exercises before my surgery?  More recent research has shown brain exercise before surgery can lower the risk of postoperative delirium (confusion) which can be especially important for older adults.  Patients who did brain exercises for 5 to 10 hours the days before surgery, cut their risk of postoperative delirium in half up to 1 week after surgery.         [facility]    Preoperative Deep Breathing Exercises    Why it is important to do deep breathing exercises before my surgery?  Deep breathing exercises strengthen your breathing muscles.  This helps you to recover after your surgery and decreases the chance of breathing complications.      How are the deep breathing exercises done?  Sit straight with your back supported.  Breathe in deeply and slowly through your nose. Your lower rib cage should expand and your abdomen may move forward.  Hold that breath for 3 to 5 seconds.  Breathe out through pursed lips, slowly and completely.  Rest and repeat 10 times every hour while awake.  Rest longer if you become dizzy or lightheaded.         Patient and Family Education             Ways You Can Help Prevent Blood Clots             This handout explains some simple things you can do to help prevent blood clots.      Blood clots are blockages that can form in the body's veins. When a blood clot forms in your deep veins, it may be called a deep vein thrombosis, or DVT for short. Blood clots can happen in any part of the body where blood flows, but they are most common in the arms and legs. If a piece of a blood clot breaks free and travels to the lungs, it is called a pulmonary embolus (PE). A PE can be a very serious problem.         Being in the hospital or having surgery can raise your chances of getting a blood clot because you may not be well  enough to move around as much as you normally do.         Ways you can help prevent blood clots in the hospital         Wearing SCDs. SCDs stands for Sequential Compression Devices.   SCDs are special sleeves that wrap around your legs  They attach to a pump that fills them with air to gently squeeze your legs every few minutes.   This helps return the blood in your legs to your heart.   SCDs should only be taken off when walking or bathing.   SCDs may not be comfortable, but they can help save your life.               Wearing compression stockings - if your doctor orders them. These special snug fitting stockings gently squeeze your legs to help blood flow.       Walking. Walking helps move the blood in your legs.   If your doctor says it is ok, try walking the halls at least   5 times a day. Ask us to help you get up, so you don't fall.      Taking any blood thinning medicines your doctor orders.        Page 1 of 2     [facility]; 3/23   Ways you can help prevent blood clots at home       Wearing compression stockings - if your doctor orders them. ? Walking - to help move the blood in your legs.       Taking any blood thinning medicines your doctor orders.      Signs of a blood clot or PE      Tell your doctor or nurse know right away if you have of the problems listed below.    If you are at home, seek medical care right away. Call 911 for chest pain or problems breathing.               Signs of a blood clot (DVT) - such as pain,  swelling, redness or warmth in your arm or leg      Signs of a pulmonary embolism (PE) - such as chest     pain or feeling short of breath

## 2025-03-18 NOTE — H&P (VIEW-ONLY)
CP[facility]/PAT Evaluation       Name: Manjula Murray (Manjlua Murray)  /Age: 1995/29 y.o.     Visit Type:   Virtual Visit       Chief Complaint: Cholesteatoma of right middle ear and mastoidOther chronic nonsuppurative otitis media of right earPerforation of right tympanic membraneConductive hearing loss of right ear with unrestricted hearing of left ear    HPI  Pt is a 29 year old female with right sided tympanic membrane retraction and hearing loss. Pt is being evaluated in [facility] in anticipation of a right tympanoplasty, possible mastoidectomy overlay and right cartilage graft with Dr. Alexander on 4/3/25.  Past Medical History:   Diagnosis Date    Cholesteatoma of right middle ear     Fractures ?    12 years old franctured my wrist    HL (hearing loss) 24    I had no pain, so I tried an ear wax cleaning kit at first. After a week and still not hearing properly I went to the  who couldnt see me for another 2 weeks. [facility] I was diagnosed with a middle ear infection.    Otitis media, chronic Child    Tympanic membrane retraction, right        Past Surgical History:   Procedure Laterality Date     SECTION, LOW TRANSVERSE  2022    EYE SURGERY      laser eye surgery at age 5       Patient Sexual activity questions deferred to the physician.    Family History   Problem Relation Name Age of Onset    Hyperlipidemia Mother Yasmine     Arthritis Mother Yasmine     Other (chronic lymphatic leukemia- benign) Mother Yasmine     Diabetes Father Jean Paul Vu     Hyperlipidemia Father Jean Paul Vu     Hypertension Father Jean Paul Vu     Arthritis Father Jean Paul Vu     COPD Father Jean Paul Vu     Obesity Father Jean Paul Vu         350lbs    Brain Aneurysm Maternal Grandmother      Cancer Maternal Grandfather          colon    Cancer Paternal Grandmother      Other (hodgkin disease) Paternal Grandmother      Cancer Father's Sister Jennifer         breast cancer    Cancer Father's Sister Yordan          breast    Birth defects Other Aunt Selene     Other (ventricular septal defect) Other Aunt Selene     Celiac disease Other Aunt Selene     Other (pacemaker) Other Aunt Selene     Early natural death Other Uncle - suspected Heart attack        No Known Allergies    Prior to Admission medications    Medication Sig Start Date End Date Taking? Authorizing Provider   Apri 0.15-0.03 mg tablet Take 1 tablet by mouth early in the morning.. 6/19/24   Historical Provider, MD   multivitamin tablet Take 1 tablet by mouth once daily.    Historical Provider, MD        PAT ROS:   Constitutional:   neg    Neuro/Psych:   neg    Eyes:   neg    Ears:    hearing loss  Nose:   neg    Mouth:   neg    Throat:   neg    Neck:   neg    Cardio:   neg    Respiratory:   neg    Endocrine:   GI:   neg    :   neg    Musculoskeletal:   Hematologic:   Skin:  neg        PAT Physical Exam     PAT AIRWAY:   Airway:     Mallampati::  II    TM distance::  >3 FB    Neck ROM::  Full  normal        Otolaryngology: Teena Alexander MD 2/13/25 presenting today for follow up. She was last seen by Dr Flowers for follow up on 11/26/2024. Today, she has complaints of difficulty hearing from her left ear.  She adds that she had 2 ear infections in December of 2024. She was given Augmentin in December as treatment. She mentions that when she gets hiccups, there is ear discomfort. She notes that her ear symptoms started when she had an ear infection last year. She denies any prior T tube placement.   Otolaryngology: Zeeshan Flowers MD 11/26/24 follow-up right sided tympanic membrane retraction and hearing loss. I recommended an opinion by Dr. Metzger and have referred her. Right ear cleaned of some small amount of cerumen today. Recheck at any point   OBGYN: Navjot Spann MD  Visit Vitals  Smoking Status Never       DASI Risk Score      Flowsheet Row Clinical Support from 3/12/2025 in [facility] Questionnaire Series Submission from 3/11/2025  in Virtual Care with Generic Provider Mark   Can you take care of yourself (eat, dress, bathe, or use toilet)?  2.75 filed at 03/12/2025 1331 2.75  filed at 03/11/2025 2302   Can you walk indoors, such as around your house? 1.75 filed at 03/12/2025 1331 1.75  filed at 03/11/2025 2302   Can you walk a block or two on level ground?  2.75 filed at 03/12/2025 1331 2.75  filed at 03/11/2025 2302   Can you climb a flight of stairs or walk up a hill? 5.5 filed at 03/12/2025 1331 5.5  filed at 03/11/2025 2302   Can you run a short distance? 8 filed at 03/12/2025 1331 8  filed at 03/11/2025 2302   Can you do light work around the house like dusting or washing dishes? 2.7 filed at 03/12/2025 1331 2.7  filed at 03/11/2025 2302   Can you do moderate work around the house like vacuuming, sweeping floors or carrying groceries? 3.5 filed at 03/12/2025 1331 3.5  filed at 03/11/2025 2302   Can you do heavy work around the house like scrubbing floors or lifting and moving heavy furniture?  8 filed at 03/12/2025 1331 8  filed at 03/11/2025 2302   Can you do yard work like raking leaves, weeding or pushing a mower? 4.5 filed at 03/12/2025 1331 4.5  filed at 03/11/2025 2302   Can you have sexual relations? 5.25 filed at 03/12/2025 1331 5.25  filed at 03/11/2025 2302   Can you participate in moderate recreational activities like golf, bowling, dancing, doubles tennis or throwing a baseball or football? 6 filed at 03/12/2025 1331 6  filed at 03/11/2025 2302   Can you participate in strenous sports like swimming, singles tennis, football, basketball, or skiing? 7.5 filed at 03/12/2025 1331 7.5  filed at 03/11/2025 2302   DASI SCORE 58.2 filed at 03/12/2025 1331 58.2  filed at 03/11/2025 2302   METS Score (Will be calculated only when all the questions are answered) 9.9 filed at 03/12/2025 1331 9.9  filed at 03/11/2025 2302          Caprini DVT Assessment    No data to display       Modified Frailty Index    No data to display        [id number] Stroke Risk Points  Current as of just now        N/A 0 to 9 Points:      Last Change: N/A          The [id number] risk score (Lip CHANTEL, et al. 2009. © 2010 [employer]) quantifies the risk of stroke for a patient with atrial fibrillation. For patients without atrial fibrillation or under the age of 18 this score appears as N/A. Higher score values generally indicate higher risk of stroke.        This score is not applicable to this patient. Components are not calculated.          Revised Cardiac Risk Index    No data to display       Apfel Simplified Score    No data to display       Risk Analysis Index Results This Encounter    No data found in the last 10 encounters.       Stop Bang Score      Flowsheet Row Questionnaire Series Submission from 3/11/2025 in [facility] Care with Generic Provider Mark   Do you snore loudly? 0  filed at 03/11/2025 2302   Do you often feel tired or fatigued after your sleep? 0  filed at 03/11/2025 2302   Has anyone ever observed you stop breathing in your sleep? 1  filed at 03/11/2025 2302   Do you have or are you being treated for high blood pressure? 0  filed at 03/11/2025 2302   Recent BMI (Calculated) 21  filed at 03/11/2025 2302   Is BMI greater than 35 kg/m2? 0=No  filed at 03/11/2025 2302   Age older than 50 years old? 0=No  filed at 03/11/2025 2302   Gender - Male 0=No  filed at 03/11/2025 2302          Prodigy: High Risk  Total Score: 0          ARISCAT Score for Postoperative Pulmonary Complications    No data to display       Meme Perioperative Risk for Myocardial Infarction or Cardiac Arrest (DADA)    No data to display         Assessment and Plan:   Medication Instructions:  Instructed to hold vitamins, supplements, and NSAIDs 7 days prior to surgery.      Anesthesia  The patient denies problems with anesthesia in the past such as PONV, prolonged sedation, awareness, dental damage, aspiration, cardiac arrest, difficult  intubation, or unexpected hospital admissions.     Neurology  The patient has no neurological diagnoses or significant findings on chart review, clinical presentation, and evaluation. No grossly apparent neurological perioperative risk.    HEENT/Airway  The patient has tympanic membrane retraction and hearing loss. No documented or reported history of airway difficulty.     Cardiovascular  The patient is scheduled for non-cardiac surgery associated with elevated risk. The patient has no major cardiac contraindications to non- cardiac surgery.  RCRI  The patient meets 0 RCRI criteria and therefor has a 3.9% risk of major adverse cardiac complications.  METS  The patient's functional capacity is greater than 4 METS.  EKG  The patient has no EKG or echocardiographic changes concerning for myocardial ischemia.   Heart Failure  The patient has no known history of heart failure.  Additionally, the patient reports no symptoms of heart failure and demonstrates no signs of heart failure.  Hypertension Evaluation  The patient has no known history of hypertension and has a normal blood pressure today.  Heart Rhythm Evaluation  The patient has no history of arrhythmias.  Heart Valve Evaluation  The patient has no known history of valvular heart disease. The patient has no symptoms or physical exam findings to suggest valvular heart disease.  Cardiology Evaluation  The patient is not followed by cardiology.    The patient has a 30-day risk for MACE of 0 predictors, 3.9% risk for cardiac death, nonfatal myocardial infarction, and nonfatal cardiac arrest.  DADA score which indicates a 0.1% risk of intraoperative or 30-day postoperative MACE (major adverse cardiac event).    Pulmonary  No significant findings on chart review or clinical presentation and evaluation.    The patient has a stop bang score of 1, which places patient at low risk for having BRISEIDA.    PRODIGY 0, low risk of respiratory depression episode. Patient given PI  sheet for preoperative deep breathing exercises.    Hematology  No diagnoses or significant findings on chart review or clinical presentation and evaluation.  Antiplatelet management   The patient is not currently receiving antiplatelet therapy.  Anticoagulation management  The patient is not currently receiving anticoagulation therapy.    Caprini score 6, high risk of perioperative VTE.     Transfusion Evaluation  T&S not obtained. Low likelihood for perioperative transfusion of blood or blood products.    Gastrointestinal  No diagnoses or significant findings on chart review or clinical presentation and evaluation.    Eat 10- 0,  self-perceived oropharyngeal dysphagia scale (0-40)     Genitourinary  No diagnoses or significant findings on chart review or clinical presentation and evaluation.    Renal  No renal diagnoses or significant findings on chart review or clinical presentation and evaluation.    Musculoskeletal  No diagnoses or significant findings on chart review or clinical presentation and evaluation.    Endocrine  Diabetes Evaluation  The patient has no history of diabetes mellitus.  Thyroid Disease Evaluation  The patient has no history of thyroid disease.    ID  No diagnoses or significant findings on chart review or clinical presentation and evaluation.    -Preoperative medication instructions were provided and reviewed with the patient.  Any additional testing or evaluation was explained to the patient.  NPO Instructions were discussed, and the patient's questions were answered prior to conclusion of this encounter. Patient verbalized understanding of preoperative instructions. After Visit Summary given.      Recent Results (from the past week)   CBC    Collection Time: 03/21/25  3:02 PM   Result Value Ref Range    WHITE BLOOD CELL COUNT 6.2 3.8 - 10.8 Thousand/uL    RED BLOOD CELL COUNT 4.64 3.80 - 5.10 Million/uL    HEMOGLOBIN 14.4 11.7 - 15.5 g/dL    HEMATOCRIT 43.6 35.0 - 45.0 %    MCV 94.0 80.0  - 100.0 fL    MCH 31.0 27.0 - 33.0 pg    MCHC 33.0 32.0 - 36.0 g/dL    RDW 12.2 11.0 - 15.0 %    PLATELET COUNT 317 140 - 400 Thousand/uL    MPV 9.2 7.5 - 12.5 fL   Basic metabolic panel    Collection Time: 03/21/25  3:02 PM   Result Value Ref Range    GLUCOSE 81 65 - 99 mg/dL    UREA NITROGEN (BUN) 11 7 - 25 mg/dL    CREATININE 0.71 0.50 - 0.96 mg/dL    EGFR 118 > OR = 60 mL/min/1.73m2    BUN/CREATININE RATIO SEE NOTE: 6 - 22 (calc)    SODIUM 140 135 - 146 mmol/L    POTASSIUM 4.3 3.5 - 5.3 mmol/L    CHLORIDE 104 98 - 110 mmol/L    CARBON DIOXIDE 25 20 - 32 mmol/L    ELECTROLYTE BALANCE 11 7 - 17 mmol/L (calc)    CALCIUM 10.0 8.6 - 10.2 mg/dL

## 2025-03-18 NOTE — CPM/PAT H&P
CPM/PAT Evaluation       Name: Manjula Murray (Manjula Murray)  /Age: 1995/29 y.o.     Visit Type:   Virtual Visit       Chief Complaint: Cholesteatoma of right middle ear and mastoidOther chronic nonsuppurative otitis media of right earPerforation of right tympanic membraneConductive hearing loss of right ear with unrestricted hearing of left ear    HPI  Pt is a 29 year old female with right sided tympanic membrane retraction and hearing loss. Pt is being evaluated in CPM in anticipation of a right tympanoplasty, possible mastoidectomy overlay and right cartilage graft with Dr. Alexander on 4/3/25.  Past Medical History:   Diagnosis Date    Cholesteatoma of right middle ear     Fractures ?    12 years old franctured my wrist    HL (hearing loss) 24    I had no pain, so I tried an ear wax cleaning kit at first. After a week and still not hearing properly I went to the  who couldnt see me for another 2 weeks. [facility] where I was diagnosed with a middle ear infection.    Otitis media, chronic Child    Tympanic membrane retraction, right        Past Surgical History:   Procedure Laterality Date     SECTION, LOW TRANSVERSE  2022    EYE SURGERY      laser eye surgery at age 5       Patient Sexual activity questions deferred to the physician.    Family History   Problem Relation Name Age of Onset    Hyperlipidemia Mother Yasmine     Arthritis Mother Yasmine     Other (chronic lymphatic leukemia- benign) Mother Yasmine     Diabetes Father Jean Paul Vu     Hyperlipidemia Father Jean Paul Vu     Hypertension Father Jean Paul Vu     Arthritis Father Jean Paul Vu     COPD Father Jean Paul Vu     Obesity Father Jean Paul Vu         350lbs    Brain Aneurysm Maternal Grandmother      Cancer Maternal Grandfather          colon    Cancer Paternal Grandmother      Other (hodgkin disease) Paternal Grandmother      Cancer Father's Sister Jennifer         breast cancer    Cancer Father's Sister Yordan          breast    Birth defects Other Aunt Selene     Other (ventricular septal defect) Other Aunt Selene     Celiac disease Other Aunt Selene     Other (pacemaker) Other Aunt Selene     Early natural death Other Uncle - suspected Heart attack        No Known Allergies    Prior to Admission medications    Medication Sig Start Date End Date Taking? Authorizing Provider   Apri 0.15-0.03 mg tablet Take 1 tablet by mouth early in the morning.. 6/19/24   Historical Provider, MD   multivitamin tablet Take 1 tablet by mouth once daily.    Historical Provider, MD        PAT ROS:   Constitutional:   neg    Neuro/Psych:   neg    Eyes:   neg    Ears:    hearing loss  Nose:   neg    Mouth:   neg    Throat:   neg    Neck:   neg    Cardio:   neg    Respiratory:   neg    Endocrine:   GI:   neg    :   neg    Musculoskeletal:   Hematologic:   Skin:  neg        PAT Physical Exam     PAT AIRWAY:   Airway:     Mallampati::  II    TM distance::  >3 FB    Neck ROM::  Full  normal        Otolaryngology: Teena Alexander MD 2/13/25 presenting today for follow up. She was last seen by Dr Flowers for follow up on 11/26/2024. Today, she has complaints of difficulty hearing from her left ear.  She adds that she had 2 ear infections in December of 2024. She was given Augmentin in December as treatment. She mentions that when she gets hiccups, there is ear discomfort. She notes that her ear symptoms started when she had an ear infection last year. She denies any prior T tube placement.   Otolaryngology: Zeeshan Flowers MD 11/26/24 follow-up right sided tympanic membrane retraction and hearing loss. I recommended an opinion by Dr. Metzger and have referred her. Right ear cleaned of some small amount of cerumen today. Recheck at any point   OBGYN: Navjot Spann MD  Visit Vitals  Smoking Status Never       DASI Risk Score      Flowsheet Row Clinical Support from 3/12/2025 in [facility] Questionnaire Series Submission from 3/11/2025  in Virtual Care with Generic Provider Mark   Can you take care of yourself (eat, dress, bathe, or use toilet)?  2.75 filed at 03/12/2025 1331 2.75  filed at 03/11/2025 2302   Can you walk indoors, such as around your house? 1.75 filed at 03/12/2025 1331 1.75  filed at 03/11/2025 2302   Can you walk a block or two on level ground?  2.75 filed at 03/12/2025 1331 2.75  filed at 03/11/2025 2302   Can you climb a flight of stairs or walk up a hill? 5.5 filed at 03/12/2025 1331 5.5  filed at 03/11/2025 2302   Can you run a short distance? 8 filed at 03/12/2025 1331 8  filed at 03/11/2025 2302   Can you do light work around the house like dusting or washing dishes? 2.7 filed at 03/12/2025 1331 2.7  filed at 03/11/2025 2302   Can you do moderate work around the house like vacuuming, sweeping floors or carrying groceries? 3.5 filed at 03/12/2025 1331 3.5  filed at 03/11/2025 2302   Can you do heavy work around the house like scrubbing floors or lifting and moving heavy furniture?  8 filed at 03/12/2025 1331 8  filed at 03/11/2025 2302   Can you do yard work like raking leaves, weeding or pushing a mower? 4.5 filed at 03/12/2025 1331 4.5  filed at 03/11/2025 2302   Can you have sexual relations? 5.25 filed at 03/12/2025 1331 5.25  filed at 03/11/2025 2302   Can you participate in moderate recreational activities like golf, bowling, dancing, doubles tennis or throwing a baseball or football? 6 filed at 03/12/2025 1331 6  filed at 03/11/2025 2302   Can you participate in strenous sports like swimming, singles tennis, football, basketball, or skiing? 7.5 filed at 03/12/2025 1331 7.5  filed at 03/11/2025 2302   DASI SCORE 58.2 filed at 03/12/2025 1331 58.2  filed at 03/11/2025 2302   METS Score (Will be calculated only when all the questions are answered) 9.9 filed at 03/12/2025 1331 9.9  filed at 03/11/2025 2302          Caprini DVT Assessment    No data to display       Modified Frailty Index    No data to display        [id number] Stroke Risk Points  Current as of just now        N/A 0 to 9 Points:      Last Change: N/A          The [id number] risk score (Lip CHANTEL, et al. 2009. © 2010 [employer]) quantifies the risk of stroke for a patient with atrial fibrillation. For patients without atrial fibrillation or under the age of 18 this score appears as N/A. Higher score values generally indicate higher risk of stroke.        This score is not applicable to this patient. Components are not calculated.          Revised Cardiac Risk Index    No data to display       Apfel Simplified Score    No data to display       Risk Analysis Index Results This Encounter    No data found in the last 10 encounters.       Stop Bang Score      Flowsheet Row Questionnaire Series Submission from 3/11/2025 in [facility] with Generic Provider Mark   Do you snore loudly? 0  filed at 03/11/2025 2302   Do you often feel tired or fatigued after your sleep? 0  filed at 03/11/2025 2302   Has anyone ever observed you stop breathing in your sleep? 1  filed at 03/11/2025 2302   Do you have or are you being treated for high blood pressure? 0  filed at 03/11/2025 2302   Recent BMI (Calculated) 21  filed at 03/11/2025 2302   Is BMI greater than 35 kg/m2? 0=No  filed at 03/11/2025 2302   Age older than 50 years old? 0=No  filed at 03/11/2025 2302   Gender - Male 0=No  filed at 03/11/2025 2302          Prodigy: High Risk  Total Score: 0          ARISCAT Score for Postoperative Pulmonary Complications    No data to display       Meme Perioperative Risk for Myocardial Infarction or Cardiac Arrest (DADA)    No data to display         Assessment and Plan:   Medication Instructions:  Instructed to hold vitamins, supplements, and NSAIDs 7 days prior to surgery.      Anesthesia  The patient denies problems with anesthesia in the past such as PONV, prolonged sedation, awareness, dental damage, aspiration, cardiac arrest, difficult  intubation, or unexpected hospital admissions.     Neurology  The patient has no neurological diagnoses or significant findings on chart review, clinical presentation, and evaluation. No grossly apparent neurological perioperative risk.    HEENT/Airway  The patient has tympanic membrane retraction and hearing loss. No documented or reported history of airway difficulty.     Cardiovascular  The patient is scheduled for non-cardiac surgery associated with elevated risk. The patient has no major cardiac contraindications to non- cardiac surgery.  RCRI  The patient meets 0 RCRI criteria and therefor has a 3.9% risk of major adverse cardiac complications.  METS  The patient's functional capacity is greater than 4 METS.  EKG  The patient has no EKG or echocardiographic changes concerning for myocardial ischemia.   Heart Failure  The patient has no known history of heart failure.  Additionally, the patient reports no symptoms of heart failure and demonstrates no signs of heart failure.  Hypertension Evaluation  The patient has no known history of hypertension and has a normal blood pressure today.  Heart Rhythm Evaluation  The patient has no history of arrhythmias.  Heart Valve Evaluation  The patient has no known history of valvular heart disease. The patient has no symptoms or physical exam findings to suggest valvular heart disease.  Cardiology Evaluation  The patient is not followed by cardiology.    The patient has a 30-day risk for MACE of 0 predictors, 3.9% risk for cardiac death, nonfatal myocardial infarction, and nonfatal cardiac arrest.  DADA score which indicates a 0.1% risk of intraoperative or 30-day postoperative MACE (major adverse cardiac event).    Pulmonary  No significant findings on chart review or clinical presentation and evaluation.    The patient has a stop bang score of 1, which places patient at low risk for having BRISEIDA.    PRODIGY 0, low risk of respiratory depression episode. Patient given PI  sheet for preoperative deep breathing exercises.    Hematology  No diagnoses or significant findings on chart review or clinical presentation and evaluation.  Antiplatelet management   The patient is not currently receiving antiplatelet therapy.  Anticoagulation management  The patient is not currently receiving anticoagulation therapy.    Caprini score 6, high risk of perioperative VTE.     Transfusion Evaluation  T&S not obtained. Low likelihood for perioperative transfusion of blood or blood products.    Gastrointestinal  No diagnoses or significant findings on chart review or clinical presentation and evaluation.    Eat 10- 0,  self-perceived oropharyngeal dysphagia scale (0-40)     Genitourinary  No diagnoses or significant findings on chart review or clinical presentation and evaluation.    Renal  No renal diagnoses or significant findings on chart review or clinical presentation and evaluation.    Musculoskeletal  No diagnoses or significant findings on chart review or clinical presentation and evaluation.    Endocrine  Diabetes Evaluation  The patient has no history of diabetes mellitus.  Thyroid Disease Evaluation  The patient has no history of thyroid disease.    ID  No diagnoses or significant findings on chart review or clinical presentation and evaluation.    -Preoperative medication instructions were provided and reviewed with the patient.  Any additional testing or evaluation was explained to the patient.  NPO Instructions were discussed, and the patient's questions were answered prior to conclusion of this encounter. Patient verbalized understanding of preoperative instructions. After Visit Summary given.

## 2025-03-21 DIAGNOSIS — Z01.818 PREOPERATIVE TESTING: ICD-10-CM

## 2025-03-22 LAB
ANION GAP SERPL CALCULATED.4IONS-SCNC: 11 MMOL/L (CALC) (ref 7–17)
BUN SERPL-MCNC: 11 MG/DL (ref 7–25)
BUN/CREAT SERPL: NORMAL (CALC) (ref 6–22)
CALCIUM SERPL-MCNC: 10 MG/DL (ref 8.6–10.2)
CHLORIDE SERPL-SCNC: 104 MMOL/L (ref 98–110)
CO2 SERPL-SCNC: 25 MMOL/L (ref 20–32)
CREAT SERPL-MCNC: 0.71 MG/DL (ref 0.5–0.96)
EGFRCR SERPLBLD CKD-EPI 2021: 118 ML/MIN/1.73M2
ERYTHROCYTE [DISTWIDTH] IN BLOOD BY AUTOMATED COUNT: 12.2 % (ref 11–15)
GLUCOSE SERPL-MCNC: 81 MG/DL (ref 65–99)
HCT VFR BLD AUTO: 43.6 % (ref 35–45)
HGB BLD-MCNC: 14.4 G/DL (ref 11.7–15.5)
MCH RBC QN AUTO: 31 PG (ref 27–33)
MCHC RBC AUTO-ENTMCNC: 33 G/DL (ref 32–36)
MCV RBC AUTO: 94 FL (ref 80–100)
PLATELET # BLD AUTO: 317 THOUSAND/UL (ref 140–400)
PMV BLD REES-ECKER: 9.2 FL (ref 7.5–12.5)
POTASSIUM SERPL-SCNC: 4.3 MMOL/L (ref 3.5–5.3)
RBC # BLD AUTO: 4.64 MILLION/UL (ref 3.8–5.1)
SODIUM SERPL-SCNC: 140 MMOL/L (ref 135–146)
WBC # BLD AUTO: 6.2 THOUSAND/UL (ref 3.8–10.8)

## 2025-03-27 ENCOUNTER — TELEPHONE (OUTPATIENT)
Dept: OTOLARYNGOLOGY | Facility: CLINIC | Age: 30
End: 2025-03-27
Payer: COMMERCIAL

## 2025-03-27 NOTE — TELEPHONE ENCOUNTER
Patient called to let you know she is having surgery with Dr. Alexander 4/3/2025 - tympanoplasty, poss mastoidectomy and cartilage graft.

## 2025-04-02 ENCOUNTER — ANESTHESIA EVENT (OUTPATIENT)
Dept: OPERATING ROOM | Facility: HOSPITAL | Age: 30
End: 2025-04-02
Payer: COMMERCIAL

## 2025-04-03 ENCOUNTER — HOSPITAL ENCOUNTER (OUTPATIENT)
Facility: HOSPITAL | Age: 30
Setting detail: OUTPATIENT SURGERY
Discharge: HOME | End: 2025-04-03
Attending: OTOLARYNGOLOGY | Admitting: OTOLARYNGOLOGY
Payer: COMMERCIAL

## 2025-04-03 ENCOUNTER — ANESTHESIA (OUTPATIENT)
Dept: OPERATING ROOM | Facility: HOSPITAL | Age: 30
End: 2025-04-03
Payer: COMMERCIAL

## 2025-04-03 VITALS
HEART RATE: 101 BPM | DIASTOLIC BLOOD PRESSURE: 78 MMHG | RESPIRATION RATE: 20 BRPM | HEIGHT: 67 IN | WEIGHT: 135.14 LBS | SYSTOLIC BLOOD PRESSURE: 121 MMHG | BODY MASS INDEX: 21.21 KG/M2 | OXYGEN SATURATION: 96 % | TEMPERATURE: 98.2 F

## 2025-04-03 DIAGNOSIS — H71.21 CHOLESTEATOMA OF RIGHT MIDDLE EAR AND MASTOID: ICD-10-CM

## 2025-04-03 DIAGNOSIS — H74.21 DISCONTINUITY OF OSSICLES OF RIGHT EAR: Primary | ICD-10-CM

## 2025-04-03 DIAGNOSIS — H72.91 PERFORATION OF RIGHT TYMPANIC MEMBRANE: ICD-10-CM

## 2025-04-03 DIAGNOSIS — H65.491 OTHER CHRONIC NONSUPPURATIVE OTITIS MEDIA OF RIGHT EAR: ICD-10-CM

## 2025-04-03 DIAGNOSIS — H90.11 CONDUCTIVE HEARING LOSS OF RIGHT EAR WITH UNRESTRICTED HEARING OF LEFT EAR: ICD-10-CM

## 2025-04-03 LAB — PREGNANCY TEST URINE, POC: NEGATIVE

## 2025-04-03 PROCEDURE — 2500000004 HC RX 250 GENERAL PHARMACY W/ HCPCS (ALT 636 FOR OP/ED)

## 2025-04-03 PROCEDURE — 7100000010 HC PHASE TWO TIME - EACH INCREMENTAL 1 MINUTE: Performed by: OTOLARYNGOLOGY

## 2025-04-03 PROCEDURE — 7100000009 HC PHASE TWO TIME - INITIAL BASE CHARGE: Performed by: OTOLARYNGOLOGY

## 2025-04-03 PROCEDURE — 3700000001 HC GENERAL ANESTHESIA TIME - INITIAL BASE CHARGE: Performed by: OTOLARYNGOLOGY

## 2025-04-03 PROCEDURE — 3700000002 HC GENERAL ANESTHESIA TIME - EACH INCREMENTAL 1 MINUTE: Performed by: OTOLARYNGOLOGY

## 2025-04-03 PROCEDURE — 3600000008 HC OR TIME - EACH INCREMENTAL 1 MINUTE - PROCEDURE LEVEL THREE: Performed by: OTOLARYNGOLOGY

## 2025-04-03 PROCEDURE — 2500000004 HC RX 250 GENERAL PHARMACY W/ HCPCS (ALT 636 FOR OP/ED): Performed by: OTOLARYNGOLOGY

## 2025-04-03 PROCEDURE — 81025 URINE PREGNANCY TEST: CPT | Performed by: STUDENT IN AN ORGANIZED HEALTH CARE EDUCATION/TRAINING PROGRAM

## 2025-04-03 PROCEDURE — 69635 REPAIR EARDRUM STRUCTURES: CPT | Performed by: OTOLARYNGOLOGY

## 2025-04-03 PROCEDURE — 21235 EAR CARTILAGE GRAFT: CPT | Performed by: OTOLARYNGOLOGY

## 2025-04-03 PROCEDURE — 2500000005 HC RX 250 GENERAL PHARMACY W/O HCPCS: Performed by: OTOLARYNGOLOGY

## 2025-04-03 PROCEDURE — 2500000002 HC RX 250 W HCPCS SELF ADMINISTERED DRUGS (ALT 637 FOR MEDICARE OP, ALT 636 FOR OP/ED): Performed by: STUDENT IN AN ORGANIZED HEALTH CARE EDUCATION/TRAINING PROGRAM

## 2025-04-03 PROCEDURE — 7100000002 HC RECOVERY ROOM TIME - EACH INCREMENTAL 1 MINUTE: Performed by: OTOLARYNGOLOGY

## 2025-04-03 PROCEDURE — 7100000001 HC RECOVERY ROOM TIME - INITIAL BASE CHARGE: Performed by: OTOLARYNGOLOGY

## 2025-04-03 PROCEDURE — 2720000007 HC OR 272 NO HCPCS: Performed by: OTOLARYNGOLOGY

## 2025-04-03 PROCEDURE — 2500000001 HC RX 250 WO HCPCS SELF ADMINISTERED DRUGS (ALT 637 FOR MEDICARE OP): Performed by: STUDENT IN AN ORGANIZED HEALTH CARE EDUCATION/TRAINING PROGRAM

## 2025-04-03 PROCEDURE — 15120 SPLT AGRFT F/S/N/H/F/G/M 1ST: CPT | Performed by: OTOLARYNGOLOGY

## 2025-04-03 PROCEDURE — C1729 CATH, DRAINAGE: HCPCS | Performed by: OTOLARYNGOLOGY

## 2025-04-03 PROCEDURE — 3600000003 HC OR TIME - INITIAL BASE CHARGE - PROCEDURE LEVEL THREE: Performed by: OTOLARYNGOLOGY

## 2025-04-03 PROCEDURE — 2500000005 HC RX 250 GENERAL PHARMACY W/O HCPCS: Performed by: STUDENT IN AN ORGANIZED HEALTH CARE EDUCATION/TRAINING PROGRAM

## 2025-04-03 PROCEDURE — 2500000004 HC RX 250 GENERAL PHARMACY W/ HCPCS (ALT 636 FOR OP/ED): Performed by: ANESTHESIOLOGIST ASSISTANT

## 2025-04-03 RX ORDER — SODIUM CHLORIDE 0.9 G/100ML
INJECTION, SOLUTION IRRIGATION AS NEEDED
Status: DISCONTINUED | OUTPATIENT
Start: 2025-04-03 | End: 2025-04-03 | Stop reason: HOSPADM

## 2025-04-03 RX ORDER — CIPROFLOXACIN AND DEXAMETHASONE 3; 1 MG/ML; MG/ML
4 SUSPENSION/ DROPS AURICULAR (OTIC) 2 TIMES DAILY
Qty: 7.5 ML | Refills: 1 | Status: SHIPPED | OUTPATIENT
Start: 2025-04-03

## 2025-04-03 RX ORDER — SODIUM CHLORIDE, SODIUM LACTATE, POTASSIUM CHLORIDE, CALCIUM CHLORIDE 600; 310; 30; 20 MG/100ML; MG/100ML; MG/100ML; MG/100ML
INJECTION, SOLUTION INTRAVENOUS CONTINUOUS PRN
Status: DISCONTINUED | OUTPATIENT
Start: 2025-04-03 | End: 2025-04-03

## 2025-04-03 RX ORDER — PROPOFOL 10 MG/ML
INJECTION, EMULSION INTRAVENOUS CONTINUOUS PRN
Status: DISCONTINUED | OUTPATIENT
Start: 2025-04-03 | End: 2025-04-03

## 2025-04-03 RX ORDER — APREPITANT 40 MG/1
40 CAPSULE ORAL ONCE
Status: COMPLETED | OUTPATIENT
Start: 2025-04-03 | End: 2025-04-03

## 2025-04-03 RX ORDER — CEFAZOLIN 1 G/1
INJECTION, POWDER, FOR SOLUTION INTRAVENOUS AS NEEDED
Status: DISCONTINUED | OUTPATIENT
Start: 2025-04-03 | End: 2025-04-03

## 2025-04-03 RX ORDER — ROCURONIUM BROMIDE 10 MG/ML
INJECTION, SOLUTION INTRAVENOUS AS NEEDED
Status: DISCONTINUED | OUTPATIENT
Start: 2025-04-03 | End: 2025-04-03

## 2025-04-03 RX ORDER — OXYCODONE HYDROCHLORIDE 5 MG/1
5 TABLET ORAL EVERY 4 HOURS PRN
Status: DISCONTINUED | OUTPATIENT
Start: 2025-04-03 | End: 2025-04-03 | Stop reason: HOSPADM

## 2025-04-03 RX ORDER — ONDANSETRON HYDROCHLORIDE 2 MG/ML
4 INJECTION, SOLUTION INTRAVENOUS ONCE AS NEEDED
Status: DISCONTINUED | OUTPATIENT
Start: 2025-04-03 | End: 2025-04-03 | Stop reason: HOSPADM

## 2025-04-03 RX ORDER — IBUPROFEN 600 MG/1
600 TABLET ORAL EVERY 6 HOURS PRN
Start: 2025-04-03

## 2025-04-03 RX ORDER — ASPIRIN 81 MG
100 TABLET, DELAYED RELEASE (ENTERIC COATED) ORAL 2 TIMES DAILY
Start: 2025-04-03

## 2025-04-03 RX ORDER — LIDOCAINE HYDROCHLORIDE AND EPINEPHRINE 10; 10 UG/ML; MG/ML
INJECTION, SOLUTION INFILTRATION; PERINEURAL AS NEEDED
Status: DISCONTINUED | OUTPATIENT
Start: 2025-04-03 | End: 2025-04-03 | Stop reason: HOSPADM

## 2025-04-03 RX ORDER — PROCHLORPERAZINE EDISYLATE 5 MG/ML
5 INJECTION INTRAMUSCULAR; INTRAVENOUS ONCE AS NEEDED
Status: DISCONTINUED | OUTPATIENT
Start: 2025-04-03 | End: 2025-04-03 | Stop reason: HOSPADM

## 2025-04-03 RX ORDER — LIDOCAINE HYDROCHLORIDE 20 MG/ML
INJECTION, SOLUTION INFILTRATION; PERINEURAL AS NEEDED
Status: DISCONTINUED | OUTPATIENT
Start: 2025-04-03 | End: 2025-04-03

## 2025-04-03 RX ORDER — ONDANSETRON HYDROCHLORIDE 2 MG/ML
INJECTION, SOLUTION INTRAVENOUS AS NEEDED
Status: DISCONTINUED | OUTPATIENT
Start: 2025-04-03 | End: 2025-04-03

## 2025-04-03 RX ORDER — ACETAMINOPHEN 325 MG/1
650 TABLET ORAL EVERY 6 HOURS PRN
Start: 2025-04-03

## 2025-04-03 RX ORDER — HYDROMORPHONE HYDROCHLORIDE 0.2 MG/ML
0.2 INJECTION INTRAMUSCULAR; INTRAVENOUS; SUBCUTANEOUS EVERY 5 MIN PRN
Status: DISCONTINUED | OUTPATIENT
Start: 2025-04-03 | End: 2025-04-03 | Stop reason: HOSPADM

## 2025-04-03 RX ORDER — CEPHALEXIN 500 MG/1
500 CAPSULE ORAL 3 TIMES DAILY
Qty: 15 CAPSULE | Refills: 0 | Status: SHIPPED | OUTPATIENT
Start: 2025-04-03 | End: 2025-04-08

## 2025-04-03 RX ORDER — SCOPOLAMINE 1 MG/3D
PATCH, EXTENDED RELEASE TRANSDERMAL AS NEEDED
Status: DISCONTINUED | OUTPATIENT
Start: 2025-04-03 | End: 2025-04-03

## 2025-04-03 RX ORDER — DROPERIDOL 2.5 MG/ML
0.62 INJECTION, SOLUTION INTRAMUSCULAR; INTRAVENOUS ONCE AS NEEDED
Status: DISCONTINUED | OUTPATIENT
Start: 2025-04-03 | End: 2025-04-03 | Stop reason: HOSPADM

## 2025-04-03 RX ORDER — ONDANSETRON 4 MG/1
4 TABLET, FILM COATED ORAL EVERY 8 HOURS PRN
Qty: 20 TABLET | Refills: 0 | Status: SHIPPED | OUTPATIENT
Start: 2025-04-03

## 2025-04-03 RX ORDER — ESMOLOL HYDROCHLORIDE 10 MG/ML
INJECTION INTRAVENOUS AS NEEDED
Status: DISCONTINUED | OUTPATIENT
Start: 2025-04-03 | End: 2025-04-03

## 2025-04-03 RX ORDER — BACITRACIN 500 [USP'U]/G
OINTMENT TOPICAL AS NEEDED
Status: DISCONTINUED | OUTPATIENT
Start: 2025-04-03 | End: 2025-04-03 | Stop reason: HOSPADM

## 2025-04-03 RX ORDER — TRAMADOL HYDROCHLORIDE 50 MG/1
50 TABLET ORAL EVERY 4 HOURS PRN
Qty: 12 TABLET | Refills: 0 | Status: SHIPPED | OUTPATIENT
Start: 2025-04-03

## 2025-04-03 RX ORDER — OXYCODONE HYDROCHLORIDE 5 MG/1
10 TABLET ORAL EVERY 4 HOURS PRN
Status: DISCONTINUED | OUTPATIENT
Start: 2025-04-03 | End: 2025-04-03 | Stop reason: HOSPADM

## 2025-04-03 RX ORDER — ACETAMINOPHEN 325 MG/1
650 TABLET ORAL EVERY 4 HOURS PRN
Status: DISCONTINUED | OUTPATIENT
Start: 2025-04-03 | End: 2025-04-03 | Stop reason: HOSPADM

## 2025-04-03 RX ORDER — MIDAZOLAM HYDROCHLORIDE 1 MG/ML
INJECTION INTRAMUSCULAR; INTRAVENOUS AS NEEDED
Status: DISCONTINUED | OUTPATIENT
Start: 2025-04-03 | End: 2025-04-03

## 2025-04-03 RX ORDER — PHENYLEPHRINE HCL IN 0.9% NACL 0.4MG/10ML
SYRINGE (ML) INTRAVENOUS AS NEEDED
Status: DISCONTINUED | OUTPATIENT
Start: 2025-04-03 | End: 2025-04-03

## 2025-04-03 RX ORDER — PROPOFOL 10 MG/ML
INJECTION, EMULSION INTRAVENOUS AS NEEDED
Status: DISCONTINUED | OUTPATIENT
Start: 2025-04-03 | End: 2025-04-03

## 2025-04-03 RX ORDER — DIPHENHYDRAMINE HYDROCHLORIDE 50 MG/ML
INJECTION, SOLUTION INTRAMUSCULAR; INTRAVENOUS AS NEEDED
Status: DISCONTINUED | OUTPATIENT
Start: 2025-04-03 | End: 2025-04-03

## 2025-04-03 RX ADMIN — Medication 6 L/MIN: at 17:01

## 2025-04-03 RX ADMIN — PROPOFOL 150 MG: 10 INJECTION, EMULSION INTRAVENOUS at 13:51

## 2025-04-03 RX ADMIN — SODIUM CHLORIDE, POTASSIUM CHLORIDE, SODIUM LACTATE AND CALCIUM CHLORIDE: 600; 310; 30; 20 INJECTION, SOLUTION INTRAVENOUS at 13:45

## 2025-04-03 RX ADMIN — SUGAMMADEX 200 MG: 100 INJECTION, SOLUTION INTRAVENOUS at 16:49

## 2025-04-03 RX ADMIN — PROPOFOL 50 MCG/KG/MIN: 10 INJECTION, EMULSION INTRAVENOUS at 14:07

## 2025-04-03 RX ADMIN — ONDANSETRON 4 MG: 2 INJECTION INTRAMUSCULAR; INTRAVENOUS at 16:31

## 2025-04-03 RX ADMIN — DEXAMETHASONE SODIUM PHOSPHATE 10 MG: 4 INJECTION, SOLUTION INTRA-ARTICULAR; INTRALESIONAL; INTRAMUSCULAR; INTRAVENOUS; SOFT TISSUE at 14:13

## 2025-04-03 RX ADMIN — SCOPOLAMINE 1 PATCH: 1.5 PATCH, EXTENDED RELEASE TRANSDERMAL at 13:35

## 2025-04-03 RX ADMIN — Medication 120 MCG: at 15:06

## 2025-04-03 RX ADMIN — PROPOFOL 50 MG: 10 INJECTION, EMULSION INTRAVENOUS at 13:57

## 2025-04-03 RX ADMIN — LIDOCAINE HYDROCHLORIDE 100 MG: 20 INJECTION, SOLUTION INFILTRATION; PERINEURAL at 13:43

## 2025-04-03 RX ADMIN — ESMOLOL HYDROCHLORIDE 20 MG: 100 INJECTION, SOLUTION INTRAVENOUS at 14:08

## 2025-04-03 RX ADMIN — REMIFENTANIL HYDROCHLORIDE 0.1 MCG/KG/MIN: 1 INJECTION, POWDER, LYOPHILIZED, FOR SOLUTION INTRAVENOUS at 13:50

## 2025-04-03 RX ADMIN — ROCURONIUM BROMIDE 40 MG: 10 INJECTION INTRAVENOUS at 13:52

## 2025-04-03 RX ADMIN — Medication 80 MCG: at 14:54

## 2025-04-03 RX ADMIN — Medication 80 MCG: at 13:52

## 2025-04-03 RX ADMIN — MIDAZOLAM HYDROCHLORIDE 2 MG: 2 INJECTION, SOLUTION INTRAMUSCULAR; INTRAVENOUS at 13:43

## 2025-04-03 RX ADMIN — APREPITANT 40 MG: 40 CAPSULE ORAL at 13:35

## 2025-04-03 RX ADMIN — ACETAMINOPHEN 650 MG: 325 TABLET ORAL at 18:28

## 2025-04-03 RX ADMIN — DIPHENHYDRAMINE HYDROCHLORIDE 25 MG: 50 INJECTION, SOLUTION INTRAMUSCULAR; INTRAVENOUS at 14:13

## 2025-04-03 RX ADMIN — REMIFENTANIL HYDROCHLORIDE 40 MCG: 1 INJECTION, POWDER, LYOPHILIZED, FOR SOLUTION INTRAVENOUS at 13:51

## 2025-04-03 RX ADMIN — CEFAZOLIN 2 G: 330 INJECTION, POWDER, FOR SOLUTION INTRAMUSCULAR; INTRAVENOUS at 14:00

## 2025-04-03 SDOH — HEALTH STABILITY: MENTAL HEALTH: CURRENT SMOKER: 0

## 2025-04-03 ASSESSMENT — COLUMBIA-SUICIDE SEVERITY RATING SCALE - C-SSRS
6. HAVE YOU EVER DONE ANYTHING, STARTED TO DO ANYTHING, OR PREPARED TO DO ANYTHING TO END YOUR LIFE?: NO
2. HAVE YOU ACTUALLY HAD ANY THOUGHTS OF KILLING YOURSELF?: NO
1. IN THE PAST MONTH, HAVE YOU WISHED YOU WERE DEAD OR WISHED YOU COULD GO TO SLEEP AND NOT WAKE UP?: NO

## 2025-04-03 ASSESSMENT — PAIN SCALES - GENERAL
PAINLEVEL_OUTOF10: 2
PAINLEVEL_OUTOF10: 1
PAINLEVEL_OUTOF10: 2
PAIN_LEVEL: 0
PAINLEVEL_OUTOF10: 2
PAINLEVEL_OUTOF10: 0 - NO PAIN
PAINLEVEL_OUTOF10: 0 - NO PAIN
PAINLEVEL_OUTOF10: 1
PAINLEVEL_OUTOF10: 0 - NO PAIN

## 2025-04-03 ASSESSMENT — PAIN - FUNCTIONAL ASSESSMENT
PAIN_FUNCTIONAL_ASSESSMENT: 0-10

## 2025-04-03 ASSESSMENT — PAIN DESCRIPTION - ORIENTATION: ORIENTATION: RIGHT

## 2025-04-03 ASSESSMENT — PAIN DESCRIPTION - DESCRIPTORS: DESCRIPTORS: DULL;SORE

## 2025-04-03 ASSESSMENT — PAIN DESCRIPTION - LOCATION: LOCATION: EAR

## 2025-04-03 NOTE — ANESTHESIA PREPROCEDURE EVALUATION
Patient: Manjula Murray    Procedure Information       Date/Time: 25 1240    Procedure: RIGHT TYMPANOPLASTY POSSIBLE MASTOIDECTOMY, OVERLAY. CARTILAGE GRAFT (Right) - Need Tympmastoid I and II    Location: [facility] OR 05 / Virtual [facility] OR    Surgeons: Teena Alexander MD            Relevant Problems   Anesthesia (within normal limits)      Cardiac (within normal limits)      Pulmonary (within normal limits)      Neuro (within normal limits)      GI (within normal limits)      /Renal (within normal limits)      Liver (within normal limits)      Endocrine (within normal limits)      HEENT   (+) Conductive hearing loss of right ear with unrestricted hearing of left ear      Skin (within normal limits)     Past Medical History:   Diagnosis Date    Cholesteatoma of right middle ear      Fractures ?     12 years old franctured my wrist    HL (hearing loss) 24     I had no pain, so I tried an ear wax cleaning kit at first. After a week and still not hearing properly I went to the  who couldnt see me for another 2 weeks. [facility] where I was diagnosed with a middle ear infection.    Otitis media, chronic Child    Tympanic membrane retraction, right      Past Surgical History:   Procedure Laterality Date     SECTION, LOW TRANSVERSE   2022    EYE SURGERY         laser eye surgery at age 5     Clinical information reviewed:   Tobacco  Allergies  Meds   Med Hx  Surg Hx  OB Status  Fam Hx  Soc   Hx        NPO Detail:  NPO/Void Status  Carbohydrate Drink Given Prior to Surgery? : N  Date of Last Liquid: 25  Time of Last Liquid: 0000  Date of Last Solid: 25  Time of Last Solid: 2230  Last Intake Type: Clear fluids; Solid meal         Physical Exam    Airway  Mallampati: I  TM distance: >3 FB  Neck ROM: full     Cardiovascular   Rhythm: regular  Rate: normal     Dental - normal exam     Pulmonary - normal exam     Abdominal   (-) obese              Anesthesia Plan    History of general anesthesia?: yes  History of complications of general anesthesia?: no    ASA 2     general     The patient is not a current smoker.    intravenous induction   Trial extubation is planned.  Anesthetic plan and risks discussed with patient.    Plan discussed with CAA.

## 2025-04-03 NOTE — OP NOTE
RIGHT TYMPANOPLASTY, CARTILAGE GRAFT, SKIN GRAFT (R) Operative Note     Date: 4/3/2025  OR Location: [facility] OR    Name: Manjula Murray YOB: 1995, Age: 29 y.o., MRN: [id number], Sex: female    Diagnosis  Pre-op Diagnosis      * Cholesteatoma of right middle ear and mastoid [H71.21]     * Other chronic nonsuppurative otitis media of right ear [H65.491]     * Perforation of right tympanic membrane [H72.91]     * Conductive hearing loss of right ear with unrestricted hearing of left ear [H90.11] Post-op Diagnosis     * Cholesteatoma of right middle ear and mastoid [H71.21]     * Other chronic nonsuppurative otitis media of right ear [H65.491]     * Conductive hearing loss of right ear with unrestricted hearing of left ear [H90.11]     * 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      Surgeons      * Teena Alexander - Primary    Resident/Fellow/Other Assistant:  Surgeons and Role:     * Olga Lidia Nayak MD - Fellow    Staff:   Ja: Juwan Warren Person: Denny  Circulator: Lashaun Okeefeub: Zoila    Anesthesia Staff: Anesthesiologist: Jose M Moreland DO; Arnold Tovar MD  C-AA: DANNY Riggs; DANNY Pacheco  MICHELLE: Carole Robert    Procedure Summary  Anesthesia: General  ASA: II  Estimated Blood Loss: 5mL  Intra-op Medications:   Administrations occurring from 1240 to 1630 on 25:   Medication Name Total Dose   bacitracin ointment 1 Application   gelatin absorbable (Gelfoam) 100 sponge 1 each   sodium chloride 0.9 % irrigation solution 3,000 mL   lidocaine-epinephrine (Xylocaine W/EPI) 1 %-1:100,000 injection 12.5 mL   polymyxin B 500,000 Units in sodium chloride 0.9% 1,000 mL irrigation 500,000 Units   ceFAZolin (Ancef) vial 1 g 2 g    dexAMETHasone (Decadron) injection 4 mg/mL 10 mg   diphenhydrAMINE 50 mg/mL 25 mg   esmolol (Brevibloc) injection 20 mg   lactated Ringer's infusion Cannot be calculated   lidocaine (Xylocaine) injection 2 % 100 mg   midazolam PF (Versed) injection 1 mg/mL 2 mg   phenylephrine 40 mcg/mL syringe 10 mL 280 mcg   propofol (Diprivan) infusion 10 mg/mL 557.99 mg   remifentanil (Ultiva) 1,000 mcg in lactated Ringer's 50 mL (20 mcg/mL) infusion 1 mg   rocuronium (ZeMuron) 50 mg/5 mL injection 40 mg   scopolamine (Transderm-Scop) 1 mg/3 days patch 1 patch   aprepitant (Emend) capsule 40 mg 40 mg              Anesthesia Record               Intraprocedure I/O Totals          Intake    Remifentanil Drip 0.00 mL    The total shown is the total volume documented since Anesthesia Start was filed.    Propofol Drip 0.00 mL    The total shown is the total volume documented since Anesthesia Start was filed.    lactated Ringer's 500.00 mL    Total Intake 500 mL          Specimen:   ID Type Source Tests Collected by Time   1 : Right middle ear contents Tissue EAR, MIDDLE EAR CONTENTS RIGHT SURGICAL PATHOLOGY EXAM Teena Alexander MD 4/3/2025 4307         Drains and/or Catheters: * None in log *    HISTORY:  Manjula Murray is a 29 y.o. female referred by Dr. Flowers for evaluation and management of right sided tympanic membrane retraction and perforation. Examination revealed a subtotal perforation with anterior TM retraction and skin debris in the mesotympanum concerning for cholesteatoma. Audiometric evaluation revealed a right-sided conductive hearing loss. Treatment options were discussed in length. Given the physical and audiometric findings, a tympanoplasty was offered. We discussed the transcanal and post-auricular approaches. No guarantees were made that the perforation will heal. The risks, alternatives and benefits were all discussed. The risks outlined included but were not limited to bleeding, surgical site infection,  hearing loss, dizziness, tinnitus, taste disturbance, graft failure and rarely facial paralysis.    OPERATIVE FINDINGS:  1) Atelectatic tympanic membrane with severe retraction of 80% of the drum, with only a small region of aeration in the anteroinferior portion of the drum.  2) There was retraction of the tympanic membrane with cholesteatoma involving the supratubal recess and the superiormost aspect of the Eustachian tube orifice, the anterior and posterior epitympanum, the retrotympanum including the posterior sinus and sinus tympani, and the upper 2/3 of the mesotympanum including the promontory, stapes suprastructure and footplate, eroded incus long process, and malleus handle. The cholesteatoma sac was densely adherent to the remnant stapes suprastructure and the malleus handle.    3) Ossicles inspected with the following findings:  -Malleus: circumferentially encased in skin, as the anterosuperior TM had retracted posteriorly under the malleus handle and the drum lateral to the malleus handle and lateral process was severely retracted onto the malleus. The umbo had a focus of granulation on its surface and had been medially rotated onto the promontory. The umbo was fractured during cholesteatoma dissection off the malleus and so was removed. Remainder of the malleus left intact.  - Incus: long process eroded, removed.    - Stapes: cholesteatoma sac tightly adherent to the partially eroded suprastructure and draped over the anterior 1/2 of the footplate. The anterior dina was eroded, but the posterior dina was intact and in continuity with the footplate, which was mobile.   4) Tympanic segment of facial nerve dehiscent, no passive stimulation throughout procedure. Chorda tympani nerve sacrificed.  5) Overlay tympanoplasty performed with temporalis fascia. Skin graft obtained from posterior auricle and placed along the anterior canal wall (native anterior canal skin was discarded due to extensive cholesteatoma  that was part of the distal flap).  6) Tragal cartilage graft harvested and placed over stapes suprastructure and atticotomy defect.  7) Due to the extent of cholesteatoma around the malleus handle and stapes suprastructure and footplate, ossicular reconstruction was delayed until the second look procedure.    OPERATIVE PROCEDURE:  The patient was evaluated in the pre-operative area. The correct site of surgery was confirmed and marked. The informed consent was signed. The patient was taken to the operative suite and laid on the operating table. A time out and huddle was done using the standard protocol. After induction of anesthesia, endotracheal intubation was done. Perioperative antibiotics were administered. The table was tilted 180 degrees and the surgical site was exposed. An appropriate amount of hair was shaved from the post-auricular region. The patient was supported to the surgical bed with tapes and belts. The skin was prepped and draped using the standard sterile techniques.  Local anesthetics using mixture of 1% lidocaine and 1/100.000 epinephrine was infiltrated in the post-auricular region. Next, the operative microscope was brought into the field and utilized throughout the case to provide high-powered magnification. Four-quadrant canal injection was then carried out. Because of the narrow ear canal, we proceeded to make the post-auricular incision down to the plane of the superficial temporalis fascia. The mastoid pericranium was incised and elevated off the mastoid cortex. The ear canal skin was dissected off the bony ear canal nearly to the level of the TM. Vascular strip incisions were made from behind, and the vascular strip was then folded and tucked underneath a self-retaining retractor.  An appropriately sized piece of temporalis fascia was harvested. This was stripped from muscle fibers and fat and was spread of a Pedro Pablo block. It was then allowed to be dried at room temperature.  The middle  ear was inspected with findings as above. We began to raise the tympanomeatal flap towards the middle ear. A curette was used to remove the scutum and perform an atticotomy to better visualize the base of the retraction pocket. The middle ear was entered and the cholesteatoma sac was dissected off the eroded incus long process, off the dehiscent facial nerve, and towards the malleus handle and stapes. The chorda tympani nerve was encased in skin and so was sacrificed. The retraction was elevated out of the posterior sinus and sinus tympani, to the stapedius and then off the stapes suprastructure, including the footplate anteriorly. Great care was taken during this dissection to avoid inadvertent stapes subluxation. The skin of the drum was very tightly adherent to the suprastructure but all gross disease appeared to have been removed. The footplate was mobile, but there was no visible or palpable anterior dina. We proceeded to dissect the retraction off the malleus handle. It became clear that the anterosuperior drum was deeply retracted posteriorly under the handle. To improve visualization for our dissection, we proceeded with a lateral graft technique.  The anterior ear canal skin was removed, as well as the remnant diseased drum, resulting in a subtotal perforation. Due to the extent of cholesteatoma and diseased drum in the distal part of this flap, the skin was discarded. A canaloplasty was completed using small abi burrs in order to create an anterior bony shelf and expose it to facilitate placement of the fascia graft. We degloved the malleus, ensuring that all skin was removed. We then dissected cholesteatoma out of the supratubal recess and anterior epitympanum. During this dissection, the umbo of the malleus was fractured, so this was removed leaving the proximal portion of the malleus handle intact.   Hemostasis and irrigation were done. There was no clear remnant cholesteatoma, but due to the extent  of disease and dense adherence of the drum to the malleus and stapes suprastructure, we deferred ossicular reconstruction for a second look procedure.  We then infiltrated local anesthetic in the posterior auricle and harvested a skin graft with a 10-blade. Hemostasis was obtained with bipolar cautery, and a Telfa soaked in local anesthetic was placed over the donor site. The skin was set aside for later use. We then made an incision in the posterior tragus and harvested a piece of cartilage. This was set aside and carved for later use. Hemostasis was obtained with bipolar cautery, and the tragal incision was closed with Fast Gut.  The temporalis graft was then trimmed, and a slit was created to allow its placement underneath the malleus to prevent lateralization. The fascia was placed underneath the malleus, with both leaflets overlapped over the superior aspect above the lateral process. Care was taken to ensure adequate apposition of the fascia along the bony shelf anteriorly leaving no open gaps and draped over the posterior bony canal.  The harvested skin graft was placed on the anterior canal wall to just overlap the fascia, and edges were rolled up to avoid trapping epithelium underneath. A thin layer of gelfoam was placed along the anterior tympanomeatal angle to prevent blunting. The fascia graft was elevated, and Gelfoam was placed around the stapes suprastructure. A cartilage cap was placed over the stapes suprastructure in preparation for future ossiculoplasty, and another small piece of cartilage was placed above this to reconstruct the attic defect. The fascia reconstruction was laid back down.  Attention was switched back to the post-auricular area. The pericranium was reapproximated with interrupted 3.0 Vicryl. Through the transcanal view, the vascular strip was placed on top of the fascia and gelfoam was layered over the vascular strip and fascia to maintain their placement.  Dermal and epidermal  closure of the post-auricular incision was completed in a multilayered fashion with interrupted 3.0 Vicryl.  A large mastoid dressing was placed. All needle counts and sponges were correct. Dr. Alexander was present and supervised the entire procedure. The table was tilted backwards towards the anesthesiologist and the patient was allowed to be awaked and transferred to recovery.     Complications:  None; patient tolerated the procedure well.    Disposition: PACU - hemodynamically stable.  Condition: stable     Attending Attestation:     Teena Alexander  Phone Number: [phone]

## 2025-04-03 NOTE — DISCHARGE INSTRUCTIONS
Postoperative Care Instructions:  Stapedectomy or Stapedotomy, Ossiculoplasty, or Transcanal Tympanoplasty    Postoperative Care:    One week after surgery, remove the cotton ball in your ear. Ear drops should be started one week after surgery and used twice a day in the ear that had surgery.  A cotton ball can be placed in the ear after placing drops in your ear if desired, but this is not necessary.  You may shower the day after surgery, but make sure to place a fresh cotton ball coated in Vaseline in the outer ear to keep it dry - this is very important.   Change these cotton balls as frequently as necessary to make sure your ear stays dry until healing is complete.  Do not completely submerge your ear under water until cleared to do so by Dr. Alexander.      Avoid blowing your nose, lifting objects heavier than a jug of milk, or straining for any reason until your follow up appointment.   Sneeze with your mouth open.  Sleep with your head elevated for two days.  Avoid any airplane travel until your follow up appointment.  Take your oral antibiotics as prescribed after surgery and take your pain medications only as prescribed and only as needed for moderate to severe pain.    Please call Dr. Alexander's office at [phone] as soon as possible to make a follow up appointment in 3 - 5 weeks.    What to Expect Following Surgery:    The following are some symptoms that may follow your recent ear surgery:    Hearing - Although your hearing may have been better immediately following surgery, it may worsen some as the healing process occurs.  You also have a small cotton ball bandage deep in your ear canal up against your ear drum which will make your hearing worse temporarily, and this needs to be removed by Dr. Alexander at your follow up appointment before the results of your surgery can be appreciated.     Taste disturbance and mouth dryness - These symptoms may occur for a few weeks following ear surgery, and are  usually temporary, but can be prolonged in rare cases.   Taste disturbance usually presents as a metallic taste in the mouth, but can come in other forms as well.    Tinnitus - Tinnitus (head noise or ringing in the ears), frequently present before surgery, is very common after surgery, but is usually temporary.  This can persist for one to two months and then may decrease in intensity as your hearing improves.  However, if your hearing is unimproved or worse after surgery, the tinnitus may persist or be worse as well.    Jaw symptoms - The front wall of the ear canal is the same as the back wall of the jaw joint, and therefore some temporary discomfort with jaw movement is common after ear surgery.  It usually subsides within one to two months.    Drainage from the ear - Some blood-tinged drainage from the ear canal may occur for 24 to 48 hours after surgery.  Please call if bloody drainage persists for more than 48 hours.         *You have a scopolamine patch behind your left ear to help with nausea and vomiting. Please remove this after 72 hours (Sunday afternoon). Please discard in trash and wash hands thoroughly after removing patch.*

## 2025-04-03 NOTE — ANESTHESIA PROCEDURE NOTES
Peripheral IV  Date/Time: 4/3/2025 1:56 PM  Inserted by: Carole Jones    Placement  Needle size: 20 G  Laterality: left  Location: hand  Site prep: alcohol  Technique: anatomical landmarks  Attempts: 1

## 2025-04-03 NOTE — ANESTHESIA PROCEDURE NOTES
Airway  Date/Time: 4/3/2025 1:53 PM  Urgency: elective    Airway not difficult    Staffing  Performed: MICHELLE   Authorized by: Arnold Tovar MD    Performed by: DANNY Pacheco  Patient location during procedure: OR    Indications and Patient Condition  Indications for airway management: anesthesia  Spontaneous Ventilation: absent  Sedation level: deep  Preoxygenated: yes  Patient position: sniffing  MILS not maintained throughout  Mask difficulty assessment: 1 - vent by mask  Planned trial extubation    Final Airway Details  Final airway type: endotracheal airway      Successful airway: ETT  Cuffed: yes   Successful intubation technique: direct laryngoscopy  Facilitating devices/methods: intubating stylet  Endotracheal tube insertion site: oral  Blade: Montez  Blade size: #3  ETT size (mm): 7.0  Cormack-Lehane Classification: grade I - full view of glottis  Placement verified by: chest auscultation and capnometry   Measured from: lips  ETT to lips (cm): 21  Number of attempts at approach: 1

## 2025-04-03 NOTE — ANESTHESIA POSTPROCEDURE EVALUATION
Patient: Manjula Murray    Procedure Summary       Date: 04/03/25 Room / Location: [facility] OR 05 / [facility] OR    Anesthesia Start: 1346 Anesthesia Stop: 1711    Procedure: RIGHT TYMPANOPLASTY, CARTILAGE GRAFT, SKIN GRAFT (Right: Ear) Diagnosis:       Cholesteatoma of right middle ear and mastoid      Other chronic nonsuppurative otitis media of right ear      Conductive hearing loss of right ear with unrestricted hearing of left ear      Discontinuity of ossicles of right ear      (Cholesteatoma of right middle ear and mastoid [H71.21])      (Other chronic nonsuppurative otitis media of right ear [H65.491])      (Perforation of right tympanic membrane [H72.91])      (Conductive hearing loss of right ear with unrestricted hearing of left ear [H90.11])    Surgeons: Teena Alexander MD Responsible Provider: Jose M Moreland DO    Anesthesia Type: general ASA Status: 2            Anesthesia Type: general    Vitals Value Taken Time   /60 04/03/25 1702   Temp 36.4 04/03/25 1712   Pulse 107 04/03/25 1709   Resp 21 04/03/25 1709   SpO2 97 % 04/03/25 1709   Vitals shown include unfiled device data.    Anesthesia Post Evaluation    Patient location during evaluation: PACU  Patient participation: complete - patient participated  Level of consciousness: awake  Pain score: 0  Pain management: adequate  Multimodal analgesia pain management approach  Airway patency: patent  Two or more strategies used to mitigate risk of obstructive sleep apnea  Cardiovascular status: acceptable  Respiratory status: face mask  Hydration status: acceptable  Postoperative Nausea and Vomiting: none        There were no known notable events for this encounter.

## 2025-04-07 ENCOUNTER — DOCUMENTATION (OUTPATIENT)
Dept: OTOLARYNGOLOGY | Facility: HOSPITAL | Age: 30
End: 2025-04-07
Payer: COMMERCIAL

## 2025-04-07 NOTE — PROGRESS NOTES
She is s/p tympanoplasty on 4/3. Patient complains of a feeling of air touching and going into the ear and causing a sharp pain. She states she picked up her niece who is about 15 lbs. No increased drainage or bleeding.    Discussed with my senior and this may be related to packing dissolving and leading to this feeling. I discussed return precautions and recommended managing conservatively. If this is still causing issues, I recommended calling Dr. Alexander's office in the mid-morning.

## 2025-04-11 LAB
LABORATORY COMMENT REPORT: NORMAL
PATH REPORT.FINAL DX SPEC: NORMAL
PATH REPORT.GROSS SPEC: NORMAL
PATH REPORT.RELEVANT HX SPEC: NORMAL
PATH REPORT.TOTAL CANCER: NORMAL

## 2025-05-02 ENCOUNTER — APPOINTMENT (OUTPATIENT)
Dept: OTOLARYNGOLOGY | Facility: CLINIC | Age: 30
End: 2025-05-02
Payer: COMMERCIAL

## 2025-05-09 ENCOUNTER — APPOINTMENT (OUTPATIENT)
Dept: OTOLARYNGOLOGY | Facility: CLINIC | Age: 30
End: 2025-05-09
Payer: COMMERCIAL

## 2025-05-09 VITALS — HEIGHT: 68 IN | WEIGHT: 130 LBS | BODY MASS INDEX: 19.7 KG/M2

## 2025-05-09 DIAGNOSIS — H65.491 OTHER CHRONIC NONSUPPURATIVE OTITIS MEDIA OF RIGHT EAR: Primary | ICD-10-CM

## 2025-05-09 PROCEDURE — 99024 POSTOP FOLLOW-UP VISIT: CPT | Performed by: OTOLARYNGOLOGY

## 2025-05-09 PROCEDURE — 1036F TOBACCO NON-USER: CPT | Performed by: OTOLARYNGOLOGY

## 2025-05-09 PROCEDURE — 3008F BODY MASS INDEX DOCD: CPT | Performed by: OTOLARYNGOLOGY

## 2025-05-09 ASSESSMENT — PATIENT HEALTH QUESTIONNAIRE - PHQ9
2. FEELING DOWN, DEPRESSED OR HOPELESS: NOT AT ALL
1. LITTLE INTEREST OR PLEASURE IN DOING THINGS: NOT AT ALL
SUM OF ALL RESPONSES TO PHQ9 QUESTIONS 1 AND 2: 0

## 2025-05-09 NOTE — PROGRESS NOTES
History of present illness:     This is a follow up visit. The patient's pain is adequately controlled and there are no significant complaints.   Physical examination showed that the incision is healing well. The ear canal was examined with the otomicroscope and the packing was removed. The graft is healing and there were no signs of infection or failure.  I recommended continuing dry ear precautions, no weight-lifting restrictions, and a follow up in 2-3 months with a hearing test.      Impression:  Right sided tympanic membrane retraction   Right sided conductive hearing loss   Right middle ear cholesteatoma   Status post right overlay tympanoplasty       Scribe Attestation  By signing my name below, IShani , Scribsven attest that this documentation has been prepared under the direction and in the presence of Teena Alexander MD.

## 2025-07-09 NOTE — PROGRESS NOTES
AUDIOLOGY ADULT AUDIOMETRIC EVALUATION     Name: Manjula Murray   : 1995 Age: 30 y.o.   Date of Evaluation: 7/10/2025 Time: 0022-1722     IMPRESSIONS   Moderate to mild conductive hearing loss in the right ear, and Hearing sensitivity within normal limits in the left ear. Decrease in thresholds in the right ear, and stable in the left ear since last evaluation on 4/3/2025.  Word recognition in quiet is excellent in both ears.  Tympanometry indicates negative middle ear pressure and enhanced eardrum mobility in the right ear, and middle ear pressure and eardrum compliance within normal limits in the left ear.   Amplification needs: Need for amplification will be reassessed following medical intervention.    Today's test results are hearing loss requiring medical/otologic and audiologic follow-up.     RECOMMENDATIONS   Continue medical follow up with primary care provider and/or Ears Nose and Throat (ENT) provider as recommended.  Return for audiologic evaluation in conjunction with medical management or annually (whichever is sooner) to monitor hearing sensitivity and assess middle ear status or sooner should concerns arise. The audiology department can be reached at [phone] to schedule an appointment.   Avoid exposure to loud sounds by moving away from the noise, turning down the volume, or wearing proper hearing protection correctly.  Consider use of good communication strategies. These include but are not limited to the following: get Manjula's attention before speaking to her, close the distance between Manjula and who is speaking, limit background noise, allow Manjula access to visual cues (i.e. facial expressions/mouth movements, pictures, written instructions, etc.). When in situations where background noise cannot be avoided, position yourself so that the background noise is to your back, and you communication partner is seated in front of you, ideally with a quiet area or wall behind them.      HISTORY   History obtained from patient report and chart review; please see medical records for complete history. Ms. Murray was seen today for a follow-up audiologic evaluation in conjunction with an evaluation with Teena Alexander MD; See same day encounter in the Ears Nose and Throat (ENT) department for additional information.      * Note, patient arrived 14 minutes late and then left the waiting room to use the restroom, leaving less than half of scheduled appointment time remaining, some testing and case history may be limited due to time constraints.      Reason for visit: Post operative right tympanoplasty   Change in Hearing: yes in the right ear greater than the left ear; poorer hearing since before surgery  Otalgia: denied  Recent Ear Infections/Otorrhea: denied  History of Ear Surgeries: yes, she is s/p RIGHT TYMPANOPLASTY, CARTILAGE GRAFT, SKIN GRAFT (R) which was performed on 4/3/2025 by Teena Alxeander MD  Falls within the last year: denied    Recall from previous encounter with Teena Alexander MD on 5/9/2025: History of present illness: This is a follow up visit. The patient's pain is adequately controlled and there are no significant complaints. Physical examination showed that the incision is healing well. The ear canal was examined with the otomicroscope and the packing was removed. The graft is healing and there were no signs of infection or failure. I recommended continuing dry ear precautions, no weight-lifting restrictions, and a follow up in 2-3 months with a hearing test.   Impression:  Right sided tympanic membrane retraction   Right sided conductive hearing loss   Right middle ear cholesteatoma   Status post right overlay tympanoplasty     Previous audiometric testing performed on 2/13/2025 revealed Mild conductive hearing loss in the right ear, and Hearing sensitivity within normal limits in the left ear. Decrease in thresholds in the right ear, and stable in the left ear since last  evaluation on 2/28/2024. Word recognition in quiet is excellent in both ears. Tympanometry indicates negative middle ear pressure with double peaks and normal eardrum mobility in the right ear, and middle ear pressure and eardrum compliance within normal limits in the left ear. Amplification needs: Need for amplification will be reassessed following medical intervention.    Recall from previous encounter in the audiology department on 2/13/2025: Ms. Murray was seen today for a follow-up audiologic evaluation in conjunction with an evaluation with Teena Alexander MD. See same day encounter with Teena Alexander MD in the Ears Nose and Throat (ENT) department for additional information. The patient presents with a retracted right tympanic membrane and reports hearing changes greater in the right ear. She denies tinnitus, aural fullness, and general ear pain but experiences sharp pain in the right ear when swallowing or with hiccups. She had bilateral ear infections about two months ago. No history of ear surgeries or dizziness. She reports occupational noise exposure with annual hearing tests, previously within normal limits. No hearing aid use. Family history includes her father’s hearing loss due to a work-related accident, not genetic. She also noted a recent personal loss, with her father passing away last week.     Previous audiometric testing performed on 2/28/2024 revealed Mild conductive hearing loss with negative middle ear pressure right ear. Left ear within normal limits.     Recall from previous encounter in the audiology department on 2/28/2024: Patient reports recent history of eustachian tube dysfunction. Audiogram 2/8/24 demonstrated mild conductive hearing loss right ear; negative middle ear pressure. Patient reports her hearing was getting better, then it blocked up again.     T Amb Aud History    7/7/2025 11:35 PM EDT - Filed by Patient   Medical History   Ototoxicity    Facial trauma    Congenital  malformation    Facial / Skull Abnormality    Stroke    Developmental delay    Hearing loss Yes   Date first noted (approx) 24   Tinnitus    Ear Problems Yes   Date first noted (approx) Kid till 19, noticed again 24   Macular degeneration    Vision problems    Dizziness    Down syndrome    Speech-language therapy    Failed hearing screening    Eustachian tube dysfunction    Speech delay    Deafness    Tympanostomy tubes    Ear Pain    Surgical History   Cochlear Implant    Stapedectomy    Tympanoplasty Yes   Occurrence date (approx) done on 4/3/25   Myringoplasty    Otoplasty    Ear shaping    Ossiculoplasty    Acoustic neuroma removal    Labrinthectomy    Mastoidectomy    Meatoplasty    Family History Refer to Family History Response table below     Family History Response  Family Member Status Father Mother Problems Age of Onset Comments   Other (Aunt Selene) Alive -- -- Celiac disease -- --       Birth defects -- --       pacemaker [Other] -- --       ventricular septal defect [Other] -- --   Other (Uncle - suspected Heart attack)  -- -- Early natural death -- --   Father's Sister (Jennifer) Alive -- Paternal Grandmother Cancer -- --   Father's Sister (Yordan) Alive -- Paternal Grandmother Cancer -- --   Mother (Yasmine) Alive Maternal Grandfather Maternal Grandmother Hyperlipidemia -- --       Arthritis -- --       chronic lymphatic leukemia- benign [Other] -- --   Father (Jean Paul Vu)  -- Paternal Grandmother COPD -- --       Diabetes -- --       Hypertension -- --       Hyperlipidemia -- --       Arthritis -- --       Obesity -- --   Maternal Grandfather Alive -- -- Cancer -- --   Maternal Grandmother Alive -- -- Brain Aneurysm -- --   Paternal Grandmother Alive -- -- Cancer -- --       hodgkin disease [Other] -- --       EVALUATION AND PATIENT EDUCATION   The following is a brief interpretation of the obtained findings from the audiologic evaluation. Discussed results and recommendations with  "Ms. Murray. Questions were addressed and the patient was encouraged to contact our department at [phone] should concerns arise.     TEST RESULTS - See scanned audiogram in \"Media.\"   Otoscopic Evaluation:   Right Ear: Ear canal clear, tympanic membrane visualized.   Left Ear: Ear canal clear, tympanic membrane visualized.       Tympanometry (226 Hz): An objective evaluation of middle ear function. CPT code: 55146   Right Ear: Type C, negative middle ear pressure (-214 daPa) and enhanced eardrum mobility.   Left Ear: Type A, middle ear pressure and tympanic membrane compliance within normal limits.       Acoustic Reflexes: An objective measure of auditory and facial nerve pathways.   (Probe) Right Ear (ipsi right stimulus ear; contralateral left stimulus ear):   Did not test.   (Probe) Left Ear (ipsi left stimulus ear; contralateral right stimulus ear):   Did not test.       Distortion Product Otoacoustic Emissions (DPOAE): An objective measurement of responses generated by the cochlea when simultaneously stimulated by two pure tone frequencies. CPT code: 93835   Right Ear: Did not test.   Left Ear: Did not test.   Present OAEs suggest normal or near cochlear outer hair cell function for corresponding frequency region(s). Absent OAEs with normal middle ear function can be consistent with some degree of hearing loss. Assessment of cochlear outer hair cell function may be impacted by outer or middle ear function.       Test technique: Conventional Audiometry via insert earphones.   An evaluation of hearing sensitivity via air and bone conduction and speech recognition. CPT code: 30668    Reliability: good       Pure Tone Audiometry:    Right Ear: Essentially moderate conductive hearing loss for 125-1000 Hz, rising to essentially mild through 8000 Hz.    Left Ear: Hearing sensitivity within normal limits for 125-8000 Hz.       Speech Audiometry:    Right Ear: Speech Reception Threshold (SRT) was obtained at 45 dB " HL. This is in good agreement with three frequency Pure Tone Average (PTA).  Word Recognition scores in quiet were excellent (100%) when words were presented at 85 dB HL. These results are based on [facility] Auditory Test No.6 (NU-6) Ordered by difficulty (N=10).   Left Ear: Speech Reception Threshold (SRT) was obtained at 5 dB HL. This is in good agreement with three frequency Pure Tone Average (PTA).  Word Recognition scores in quiet were excellent (100%) when words were presented at 45 dB HL. These results are based on [facility] Auditory Test No.6 (NU-6) Ordered by difficulty (N=10).          Josie Fregoso, AUD, CCC-A  Licensed Clinical Audiologist    Degree of Hearing Decibel Range  Key   Within Normal Limits 0-20  CNT Could Not Test   Slight 21-25  DNT Did Not Test   Mild 26-40  ECV Ear Canal Volume   Moderate 41-55  OAE Otoacoustic Emissions   Moderately-Severe 56-70  SIN Speech in Noise   Severe 71-90  TM Tympanic Membrane   Profound 91+  HA Hearing Aid   Sensorineural Hearing Loss SNHL  MLV Monitored Live Voice   Conductive Hearing Loss CHL  WNL Within Normal Limits   Noise-Induced Hearing Loss NIHL

## 2025-07-10 ENCOUNTER — CLINICAL SUPPORT (OUTPATIENT)
Dept: AUDIOLOGY | Facility: CLINIC | Age: 30
End: 2025-07-10
Payer: COMMERCIAL

## 2025-07-10 ENCOUNTER — APPOINTMENT (OUTPATIENT)
Dept: OTOLARYNGOLOGY | Facility: CLINIC | Age: 30
End: 2025-07-10
Payer: COMMERCIAL

## 2025-07-10 VITALS — BODY MASS INDEX: 20.63 KG/M2 | WEIGHT: 135.7 LBS

## 2025-07-10 DIAGNOSIS — H90.11 CONDUCTIVE HEARING LOSS OF RIGHT EAR WITH UNRESTRICTED HEARING OF LEFT EAR: Primary | ICD-10-CM

## 2025-07-10 DIAGNOSIS — H69.91 DYSFUNCTION OF RIGHT EUSTACHIAN TUBE: ICD-10-CM

## 2025-07-10 PROCEDURE — 1036F TOBACCO NON-USER: CPT | Performed by: OTOLARYNGOLOGY

## 2025-07-10 PROCEDURE — 92567 TYMPANOMETRY: CPT

## 2025-07-10 PROCEDURE — 92557 COMPREHENSIVE HEARING TEST: CPT

## 2025-07-10 PROCEDURE — 99213 OFFICE O/P EST LOW 20 MIN: CPT | Performed by: OTOLARYNGOLOGY

## 2025-07-10 ASSESSMENT — PATIENT HEALTH QUESTIONNAIRE - PHQ9
2. FEELING DOWN, DEPRESSED OR HOPELESS: NOT AT ALL
SUM OF ALL RESPONSES TO PHQ9 QUESTIONS 1 AND 2: 0
1. LITTLE INTEREST OR PLEASURE IN DOING THINGS: NOT AT ALL

## 2025-07-10 ASSESSMENT — PAIN - FUNCTIONAL ASSESSMENT: PAIN_FUNCTIONAL_ASSESSMENT: 0-10

## 2025-07-10 ASSESSMENT — PAIN SCALES - GENERAL: PAINLEVEL_OUTOF10: 0 - NO PAIN

## 2025-07-10 NOTE — PROGRESS NOTES
History of present illness:  Manjula Murray is a 30 y.o. female presenting today for follow up, she is status post right overlay tympanoplasty with ossiculoplasty.  There are currently no active complaints.  Hearing seems to be fluctuating on the right.    RECALL 2/13/2025  Manjula Murray is a 30 y.o. female presenting today for follow up. She was last seen by Dr Flowers for follow up on 11/26/2024. At that visit, she noted improvement when she used Valsalva which can be seen to lateralize the anterior tympanic membrane but does not release the tympanic membrane from the incus/I-S joint CT scan was reviewed and demonstrated 4 mm density around the posterior mesotympanum consistent with the retraction seen.     Today, she has complaints of difficulty hearing from her left ear.  She adds that she had 2 ear infections in December of 2024. She was given Augmentin in December as treatment. She mentions that when she gets hiccups, there is ear discomfort. She notes that her ear symptoms started when she had an ear infection last year. She denies any prior T tube placement.     The patient's current medications, active allergies and list of medical problems were reviewed in the EHR and confirmed electronically there are as follows;    Allergies:   No Known Allergies    Current list of medications:   Current Outpatient Medications   Medication Sig Dispense Refill    multivitamin tablet Take 1 tablet by mouth once daily.      acetaminophen (Tylenol) 325 mg tablet Take 2 tablets (650 mg) by mouth every 6 hours if needed for mild pain (1 - 3) for up to 20 doses.      Apri 0.15-0.03 mg tablet Take 1 tablet by mouth early in the morning..      ciprofloxacin-dexamethasone (CiproDEX) otic suspension Administer 4 drops into the right ear 2 times a day. Start drops 1 week after surgery (4/10) 7.5 mL 1    docusate sodium (Colace) 100 mg tablet Take 1 tablet (100 mg) by mouth 2 times a day. Take while using narcotics for pain  control      ibuprofen 600 mg tablet Take 1 tablet (600 mg) by mouth every 6 hours if needed for moderate pain (4 - 6) for up to 20 doses.      ondansetron (Zofran) 4 mg tablet Take 1 tablet (4 mg) by mouth every 8 hours if needed for nausea or vomiting for up to 20 doses. 20 tablet 0    traMADol (Ultram) 50 mg tablet Take 1 tablet (50 mg) by mouth every 4 hours if needed for severe pain (7 - 10) (pain unrelieved by tylenol/ibuprofen) for up to 12 doses. 12 tablet 0     No current facility-administered medications for this visit.     Problem list:  Patient Active Problem List   Diagnosis    Cholesteatoma of right middle ear and mastoid    Chronic otitis media    Perforation of right tympanic membrane    Conductive hearing loss of right ear with unrestricted hearing of left ear     Physical Examination:  CONSTITUTIONAL:  No acute distress  VOICE:  No hoarseness or other abnormality  RESPIRATION:  Breathing comfortably, no stridor  CV:  No clubbing/cyanosis/edema in hands  EYES:  EOM intact, sclera clear  NEURO:  Alert and oriented times 3, Cranial nerves II-XII grossly intact and symmetric bilaterally  HEAD AND FACE:  Symmetric facial features, no masses or lesions  RIGHT EAR: Examination revealed that the graft is actually intact cartilage grafting is seen in the posterior superior quadrant.  There is significant atelectasis of the graft but she is able to reverse it with insufflation.  While insufflating and was the eardrum reversed the air conduction is greater than the bone-conduction at 512 and 1024 Hz tuning forks.  LEFT EAR: Normal external ear and post auricular area, no visible lesions, external auditory canal patent, tympanic membrane intact, no retraction, no signs of mass, effusion, or infection within the middle ear  NOSE:  Anterior rhinoscopy clear, no significant external deformity.  ORAL CAVITY/OROPHARYNX/LIPS:  normal lining, mobile tongue.  PHARYNGEAL WALLS: Moist mucosal lining, good palatal  elevation  NECK/LYMPH:  No LAD, no thyroid masses, trachea midline  SKIN:  Neck and facial skin is without scar or injury  PSYCH:  Alert and oriented with appropriate mood and affect        Diagnostic testing:    Audiometry:  Audiometry testing done on 02/13/2025 reveals:  On the right: Right mild to moderate conductive hearing loss. Type C, tympanogram.  Left within normal hearing sensitivity.  Type a tympanogram.      I personally reviewed the available patient's external record and independently reviewed their audiometric testing [and] radiographic imaging through the appropriate viewing software as detailed in my note and agree with the detailed report.    Impression:  History of right-sided tympanic membrane perforation  Right chronic eustachian tube dysfunction  Right conductive hearing loss    Recommendation:    Given the physical exam findings I did recommend insufflation was an aggressive regimen of 15-20 times twice a day for the next several weeks.  I will reassess her here in couple months and determine whether a tympanostomy tube would be indicated.  This will be the first step but that fails she will need a revision cartilage tympanoplasty was cartilage palisading tympanoplasty given the extent of her eustachian tube dysfunction    I discussed with the patient the complexity of my medical decision making including the treatment and testing rational, indications of their elective procedure and possible adverse effects and/or complications. Based on the provided documentation and my professional assessment of this patient's chronic progressive conditions, the complexity of evaluation and treatment is moderate.    This note was created using speech recognition transcription software. Despite proofreading, several typographical errors might be present that might affect the meaning of the content. Please call with any questions.    Scribe Attestation  By signing my name below, Janice DAVILA Scribe    attest that this documentation has been prepared under the direction and in the presence of Teena Alexander MD.

## 2025-09-05 ENCOUNTER — APPOINTMENT (OUTPATIENT)
Dept: OTOLARYNGOLOGY | Facility: CLINIC | Age: 30
End: 2025-09-05
Payer: COMMERCIAL

## 2025-12-11 ENCOUNTER — APPOINTMENT (OUTPATIENT)
Dept: OTOLARYNGOLOGY | Facility: CLINIC | Age: 30
End: 2025-12-11
Payer: COMMERCIAL

## (undated) DEVICE — MANIFOLD, 4 PORT NEPTUNE STANDARD

## (undated) DEVICE — SYRINGE, MONOJECT, LUER LOCK, 3 CC, LF

## (undated) DEVICE — SPONGE, HEMOSTATIC, GELATIN, SURGIFOAM, 8 X 12.5 CM X 10 MM

## (undated) DEVICE — BUR, 3MM DIAMOND BALL STD

## (undated) DEVICE — TAPE, SILK, DURAPORE, 3 IN X 10 YD, LF

## (undated) DEVICE — CATHETER, URETHRAL, MALECOT, 4 WING, 14 FR, LATEX

## (undated) DEVICE — BUR, 2MM DIAMOND BALL STD

## (undated) DEVICE — TUBING, SUCTION, CONNECTING, STERILE 0.25 X 120 IN., LF

## (undated) DEVICE — CLEANER, WIPE, INSTRUMENT, 3.25 X 3.25 IN

## (undated) DEVICE — BALL, DIAMOND COARSE 3MM STANDARD

## (undated) DEVICE — PROTECTOR, NERVE, ULNAR, PINK

## (undated) DEVICE — BLADE, OPHTHALMIC, BEAVER, MINI, SHARP ONE SIDE

## (undated) DEVICE — DRAPE, INCISE, ANTIMICROBIAL, IOBAN 2, LARGE, 17 X 23 IN, DISPOSABLE, STERILE

## (undated) DEVICE — ELECTRODE, PAIRED SUBDERMAL OTO

## (undated) DEVICE — STRIP, SKIN CLOSURE, STERI STRIP, REINFORCED, 0.5 X 4 IN

## (undated) DEVICE — Device

## (undated) DEVICE — PACKING, PLAIN, CURITY, 0.25 IN X 5 YD, STERILE

## (undated) DEVICE — COVER, CART, 45 X 27 X 48 IN, CLEAR

## (undated) DEVICE — ADHESIVE, SKIN, MASTISOL, 2/3 CC VIAL

## (undated) DEVICE — ADMINISTRATION SET, VENTED, FLASHBALL, 109 IN

## (undated) DEVICE — REST, HEAD, BAGEL, 9 IN

## (undated) DEVICE — NEEDLE, HYPODERMIC, 25 G X 1.5 IN, A BEVEL, STERILE

## (undated) DEVICE — DRESSING, EAR, GLASSCOCK, ADULT

## (undated) DEVICE — DRAPE, SHEET, 17 X 23 IN

## (undated) DEVICE — DRAPE, SMARTDRAPE, FOR TIVATO MICROSCOPE

## (undated) DEVICE — BOWL, BASIN, 32 OZ, STERILE

## (undated) DEVICE — NEEDLE, HYPODERMIC, MONOJECT, 27 G X 1.5 IN